# Patient Record
Sex: FEMALE | Race: ASIAN | NOT HISPANIC OR LATINO | Employment: OTHER
[De-identification: names, ages, dates, MRNs, and addresses within clinical notes are randomized per-mention and may not be internally consistent; named-entity substitution may affect disease eponyms.]

---

## 2017-03-12 ENCOUNTER — RECORDS - HEALTHEAST (OUTPATIENT)
Dept: ADMINISTRATIVE | Facility: OTHER | Age: 72
End: 2017-03-12

## 2017-03-16 ENCOUNTER — TELEPHONE (OUTPATIENT)
Dept: GASTROENTEROLOGY | Facility: OUTPATIENT CENTER | Age: 72
End: 2017-03-16

## 2017-03-16 DIAGNOSIS — Z12.11 ENCOUNTER FOR SCREENING COLONOSCOPY: Primary | ICD-10-CM

## 2017-03-16 NOTE — TELEPHONE ENCOUNTER
Patient taking any blood thinners ? no    Heart disease ? Murmur. Patient reports no problems    Lung disease ? denies      Sleep apnea ? denies    Diabetic ? denies    Kidney disease ? denies    Dialysis ? n/a    Electronic implanted medical devices ? denies    Are you taking any narcotic pain medication ?  no What is your daily dosage ?    PTSD ? n/a    Prep instructions reviewed with patient ? Instructions,  policy, conscious sedation plan reviewed. Pt. Has had exam before.    Pharmacy : [facility]    Indication for procedure : screening colonoscopy    Referring provider : Dr. Nahid Madera

## 2017-03-23 ENCOUNTER — TRANSFERRED RECORDS (OUTPATIENT)
Dept: HEALTH INFORMATION MANAGEMENT | Facility: CLINIC | Age: 72
End: 2017-03-23

## 2017-03-23 ENCOUNTER — AMBULATORY - HEALTHEAST (OUTPATIENT)
Dept: MULTI SPECIALTY CLINIC | Facility: CLINIC | Age: 72
End: 2017-03-23

## 2017-05-30 ENCOUNTER — COMMUNICATION - HEALTHEAST (OUTPATIENT)
Dept: SCHEDULING | Facility: CLINIC | Age: 72
End: 2017-05-30

## 2017-05-31 ENCOUNTER — OFFICE VISIT - HEALTHEAST (OUTPATIENT)
Dept: INTERNAL MEDICINE | Facility: CLINIC | Age: 72
End: 2017-05-31

## 2017-05-31 ENCOUNTER — AMBULATORY - HEALTHEAST (OUTPATIENT)
Dept: INTERNAL MEDICINE | Facility: CLINIC | Age: 72
End: 2017-05-31

## 2017-05-31 DIAGNOSIS — E55.9 VITAMIN D DEFICIENCY: ICD-10-CM

## 2017-05-31 DIAGNOSIS — R73.09 OTHER ABNORMAL GLUCOSE: ICD-10-CM

## 2017-05-31 DIAGNOSIS — R03.0 ELEVATED BP WITHOUT DIAGNOSIS OF HYPERTENSION: ICD-10-CM

## 2017-05-31 DIAGNOSIS — E78.5 HYPERLIPEMIA: ICD-10-CM

## 2017-05-31 DIAGNOSIS — M81.0 SENILE OSTEOPOROSIS: ICD-10-CM

## 2017-06-02 LAB
CHOLEST SERPL-MCNC: 256 MG/DL
FASTING STATUS PATIENT QL REPORTED: YES
HBA1C MFR BLD: 6 % (ref 3.5–6)
HDLC SERPL-MCNC: 62 MG/DL
LDLC SERPL CALC-MCNC: 155 MG/DL
TRIGL SERPL-MCNC: 194 MG/DL

## 2017-06-05 ENCOUNTER — COMMUNICATION - HEALTHEAST (OUTPATIENT)
Dept: INTERNAL MEDICINE | Facility: CLINIC | Age: 72
End: 2017-06-05

## 2017-06-05 DIAGNOSIS — E78.5 HYPERLIPEMIA: ICD-10-CM

## 2017-06-07 ENCOUNTER — RECORDS - HEALTHEAST (OUTPATIENT)
Dept: ADMINISTRATIVE | Facility: OTHER | Age: 72
End: 2017-06-07

## 2017-06-29 ENCOUNTER — COMMUNICATION - HEALTHEAST (OUTPATIENT)
Dept: INTERNAL MEDICINE | Facility: CLINIC | Age: 72
End: 2017-06-29

## 2017-06-29 ENCOUNTER — RECORDS - HEALTHEAST (OUTPATIENT)
Dept: ADMINISTRATIVE | Facility: OTHER | Age: 72
End: 2017-06-29

## 2017-07-27 ENCOUNTER — OFFICE VISIT - HEALTHEAST (OUTPATIENT)
Dept: INTERNAL MEDICINE | Facility: CLINIC | Age: 72
End: 2017-07-27

## 2017-07-27 DIAGNOSIS — R25.2 LEG CRAMPS: ICD-10-CM

## 2017-07-27 DIAGNOSIS — Z00.00 HEALTH CARE MAINTENANCE: ICD-10-CM

## 2017-07-27 DIAGNOSIS — Z12.11 SCREEN FOR COLON CANCER: ICD-10-CM

## 2017-07-27 DIAGNOSIS — R73.01 IMPAIRED FASTING GLUCOSE: ICD-10-CM

## 2017-07-27 DIAGNOSIS — M81.0 SENILE OSTEOPOROSIS: ICD-10-CM

## 2017-07-27 DIAGNOSIS — E55.9 VITAMIN D DEFICIENCY: ICD-10-CM

## 2017-07-27 DIAGNOSIS — E78.5 HYPERLIPEMIA: ICD-10-CM

## 2017-07-27 LAB
CHOLEST SERPL-MCNC: 257 MG/DL
FASTING STATUS PATIENT QL REPORTED: YES
HDLC SERPL-MCNC: 71 MG/DL
LDLC SERPL CALC-MCNC: 160 MG/DL
TRIGL SERPL-MCNC: 129 MG/DL

## 2017-07-27 ASSESSMENT — MIFFLIN-ST. JEOR: SCORE: 1065.79

## 2017-07-29 ENCOUNTER — COMMUNICATION - HEALTHEAST (OUTPATIENT)
Dept: INTERNAL MEDICINE | Facility: CLINIC | Age: 72
End: 2017-07-29

## 2017-07-29 DIAGNOSIS — N39.0 UTI (URINARY TRACT INFECTION): ICD-10-CM

## 2017-07-30 ENCOUNTER — COMMUNICATION - HEALTHEAST (OUTPATIENT)
Dept: INTERNAL MEDICINE | Facility: CLINIC | Age: 72
End: 2017-07-30

## 2017-08-25 ENCOUNTER — COMMUNICATION - HEALTHEAST (OUTPATIENT)
Dept: INTERNAL MEDICINE | Facility: CLINIC | Age: 72
End: 2017-08-25

## 2017-08-25 ENCOUNTER — RECORDS - HEALTHEAST (OUTPATIENT)
Dept: ADMINISTRATIVE | Facility: OTHER | Age: 72
End: 2017-08-25

## 2017-08-25 ENCOUNTER — AMBULATORY - HEALTHEAST (OUTPATIENT)
Dept: LAB | Facility: CLINIC | Age: 72
End: 2017-08-25

## 2017-08-25 DIAGNOSIS — N39.0 UTI (URINARY TRACT INFECTION): ICD-10-CM

## 2017-09-05 ENCOUNTER — COMMUNICATION - HEALTHEAST (OUTPATIENT)
Dept: INTERNAL MEDICINE | Facility: CLINIC | Age: 72
End: 2017-09-05

## 2017-09-05 ENCOUNTER — RECORDS - HEALTHEAST (OUTPATIENT)
Dept: ADMINISTRATIVE | Facility: OTHER | Age: 72
End: 2017-09-05

## 2017-09-26 ENCOUNTER — HOSPITAL ENCOUNTER (OUTPATIENT)
Dept: MAMMOGRAPHY | Facility: HOSPITAL | Age: 72
Discharge: HOME | End: 2017-09-26
Attending: INTERNAL MEDICINE

## 2017-09-26 DIAGNOSIS — Z00.00 HEALTH CARE MAINTENANCE: ICD-10-CM

## 2017-09-26 DIAGNOSIS — Z12.31 VISIT FOR SCREENING MAMMOGRAM: ICD-10-CM

## 2017-12-13 ENCOUNTER — OFFICE VISIT - HEALTHEAST (OUTPATIENT)
Dept: INTERNAL MEDICINE | Facility: CLINIC | Age: 72
End: 2017-12-13

## 2017-12-13 DIAGNOSIS — R53.83 FATIGUE: ICD-10-CM

## 2017-12-13 DIAGNOSIS — E78.5 HYPERLIPEMIA: ICD-10-CM

## 2017-12-13 DIAGNOSIS — R73.09 OTHER ABNORMAL GLUCOSE: ICD-10-CM

## 2017-12-26 ENCOUNTER — AMBULATORY - HEALTHEAST (OUTPATIENT)
Dept: LAB | Facility: CLINIC | Age: 72
End: 2017-12-26

## 2017-12-26 DIAGNOSIS — E78.5 HYPERLIPEMIA: ICD-10-CM

## 2017-12-26 DIAGNOSIS — R53.83 FATIGUE: ICD-10-CM

## 2017-12-26 DIAGNOSIS — R73.09 OTHER ABNORMAL GLUCOSE: ICD-10-CM

## 2017-12-26 LAB
CHOLEST SERPL-MCNC: 244 MG/DL
FASTING STATUS PATIENT QL REPORTED: YES
HBA1C MFR BLD: 6.1 % (ref 3.5–6)
HDLC SERPL-MCNC: 71 MG/DL
LDLC SERPL CALC-MCNC: 152 MG/DL
TRIGL SERPL-MCNC: 103 MG/DL

## 2017-12-27 ENCOUNTER — OFFICE VISIT - HEALTHEAST (OUTPATIENT)
Dept: INTERNAL MEDICINE | Facility: CLINIC | Age: 72
End: 2017-12-27

## 2017-12-27 DIAGNOSIS — E78.5 HYPERLIPEMIA: ICD-10-CM

## 2017-12-27 DIAGNOSIS — D56.3 THALASSEMIA MINOR: ICD-10-CM

## 2018-05-29 ENCOUNTER — COMMUNICATION - HEALTHEAST (OUTPATIENT)
Dept: ADMINISTRATIVE | Facility: CLINIC | Age: 73
End: 2018-05-29

## 2018-05-30 ENCOUNTER — COMMUNICATION - HEALTHEAST (OUTPATIENT)
Dept: LAB | Facility: CLINIC | Age: 73
End: 2018-05-30

## 2018-05-30 DIAGNOSIS — R73.03 PREDIABETES: ICD-10-CM

## 2018-05-30 DIAGNOSIS — M81.0 OSTEOPOROSIS: ICD-10-CM

## 2018-05-30 DIAGNOSIS — E78.5 DYSLIPIDEMIA: ICD-10-CM

## 2018-06-01 ENCOUNTER — AMBULATORY - HEALTHEAST (OUTPATIENT)
Dept: LAB | Facility: CLINIC | Age: 73
End: 2018-06-01

## 2018-06-01 DIAGNOSIS — R73.03 PREDIABETES: ICD-10-CM

## 2018-06-01 DIAGNOSIS — M81.0 OSTEOPOROSIS: ICD-10-CM

## 2018-06-01 DIAGNOSIS — E78.5 DYSLIPIDEMIA: ICD-10-CM

## 2018-06-01 LAB
ALBUMIN SERPL-MCNC: 4.1 G/DL (ref 3.5–5)
ALP SERPL-CCNC: 30 U/L (ref 45–120)
ALT SERPL W P-5'-P-CCNC: 18 U/L (ref 0–45)
ANION GAP SERPL CALCULATED.3IONS-SCNC: 11 MMOL/L (ref 5–18)
AST SERPL W P-5'-P-CCNC: 24 U/L (ref 0–40)
BILIRUB SERPL-MCNC: 0.7 MG/DL (ref 0–1)
BUN SERPL-MCNC: 18 MG/DL (ref 8–28)
CALCIUM SERPL-MCNC: 9.9 MG/DL (ref 8.5–10.5)
CHLORIDE BLD-SCNC: 104 MMOL/L (ref 98–107)
CHOLEST SERPL-MCNC: 284 MG/DL
CO2 SERPL-SCNC: 25 MMOL/L (ref 22–31)
CREAT SERPL-MCNC: 0.76 MG/DL (ref 0.6–1.1)
FASTING STATUS PATIENT QL REPORTED: YES
GFR SERPL CREATININE-BSD FRML MDRD: >60 ML/MIN/1.73M2
GLUCOSE BLD-MCNC: 105 MG/DL (ref 70–125)
HBA1C MFR BLD: 6.3 % (ref 3.5–6)
HDLC SERPL-MCNC: 76 MG/DL
LDLC SERPL CALC-MCNC: 180 MG/DL
POTASSIUM BLD-SCNC: 4.9 MMOL/L (ref 3.5–5)
PROT SERPL-MCNC: 6.9 G/DL (ref 6–8)
SODIUM SERPL-SCNC: 140 MMOL/L (ref 136–145)
TRIGL SERPL-MCNC: 138 MG/DL

## 2018-06-04 LAB
25(OH)D3 SERPL-MCNC: 36.7 NG/ML (ref 30–80)
25(OH)D3 SERPL-MCNC: 36.7 NG/ML (ref 30–80)

## 2018-06-07 ENCOUNTER — OFFICE VISIT - HEALTHEAST (OUTPATIENT)
Dept: INTERNAL MEDICINE | Facility: CLINIC | Age: 73
End: 2018-06-07

## 2018-06-07 DIAGNOSIS — M81.0 SENILE OSTEOPOROSIS: ICD-10-CM

## 2018-06-07 DIAGNOSIS — E78.5 HYPERLIPEMIA: ICD-10-CM

## 2018-06-07 ASSESSMENT — MIFFLIN-ST. JEOR: SCORE: 1063.52

## 2018-06-09 ENCOUNTER — HEALTH MAINTENANCE LETTER (OUTPATIENT)
Age: 73
End: 2018-06-09

## 2018-06-15 ENCOUNTER — OFFICE VISIT - HEALTHEAST (OUTPATIENT)
Dept: INTERNAL MEDICINE | Facility: CLINIC | Age: 73
End: 2018-06-15

## 2018-06-15 DIAGNOSIS — L73.9 FOLLICULITIS: ICD-10-CM

## 2018-08-09 ENCOUNTER — RECORDS - HEALTHEAST (OUTPATIENT)
Dept: BONE DENSITY | Facility: CLINIC | Age: 73
End: 2018-08-09

## 2018-08-09 ENCOUNTER — RECORDS - HEALTHEAST (OUTPATIENT)
Dept: ADMINISTRATIVE | Facility: OTHER | Age: 73
End: 2018-08-09

## 2018-08-09 DIAGNOSIS — M81.0 AGE-RELATED OSTEOPOROSIS WITHOUT CURRENT PATHOLOGICAL FRACTURE: ICD-10-CM

## 2018-08-29 ENCOUNTER — COMMUNICATION - HEALTHEAST (OUTPATIENT)
Dept: INTERNAL MEDICINE | Facility: CLINIC | Age: 73
End: 2018-08-29

## 2018-08-29 DIAGNOSIS — R73.09 OTHER ABNORMAL GLUCOSE: ICD-10-CM

## 2018-09-04 ENCOUNTER — COMMUNICATION - HEALTHEAST (OUTPATIENT)
Dept: INTERNAL MEDICINE | Facility: CLINIC | Age: 73
End: 2018-09-04

## 2018-09-04 DIAGNOSIS — R73.09 OTHER ABNORMAL GLUCOSE: ICD-10-CM

## 2018-09-10 ENCOUNTER — COMMUNICATION - HEALTHEAST (OUTPATIENT)
Dept: INTERNAL MEDICINE | Facility: CLINIC | Age: 73
End: 2018-09-10

## 2018-09-10 DIAGNOSIS — R73.09 OTHER ABNORMAL GLUCOSE: ICD-10-CM

## 2018-09-18 ENCOUNTER — COMMUNICATION - HEALTHEAST (OUTPATIENT)
Dept: INTERNAL MEDICINE | Facility: CLINIC | Age: 73
End: 2018-09-18

## 2018-09-18 ENCOUNTER — RECORDS - HEALTHEAST (OUTPATIENT)
Dept: ADMINISTRATIVE | Facility: OTHER | Age: 73
End: 2018-09-18

## 2018-09-23 ENCOUNTER — COMMUNICATION - HEALTHEAST (OUTPATIENT)
Dept: INTERNAL MEDICINE | Facility: CLINIC | Age: 73
End: 2018-09-23

## 2018-10-08 ENCOUNTER — COMMUNICATION - HEALTHEAST (OUTPATIENT)
Dept: ADMINISTRATIVE | Facility: CLINIC | Age: 73
End: 2018-10-08

## 2018-10-18 ENCOUNTER — OFFICE VISIT - HEALTHEAST (OUTPATIENT)
Dept: FAMILY MEDICINE | Facility: CLINIC | Age: 73
End: 2018-10-18

## 2018-10-18 ENCOUNTER — COMMUNICATION - HEALTHEAST (OUTPATIENT)
Dept: SCHEDULING | Facility: CLINIC | Age: 73
End: 2018-10-18

## 2018-10-18 DIAGNOSIS — R82.81 PYURIA: ICD-10-CM

## 2018-10-18 DIAGNOSIS — R39.9 UTI SYMPTOMS: ICD-10-CM

## 2018-10-18 LAB
ALBUMIN UR-MCNC: NEGATIVE MG/DL
APPEARANCE UR: CLEAR
BACTERIA #/AREA URNS HPF: ABNORMAL HPF
BILIRUB UR QL STRIP: NEGATIVE
COLOR UR AUTO: YELLOW
GLUCOSE UR STRIP-MCNC: NEGATIVE MG/DL
HGB UR QL STRIP: ABNORMAL
KETONES UR STRIP-MCNC: NEGATIVE MG/DL
LEUKOCYTE ESTERASE UR QL STRIP: ABNORMAL
NITRATE UR QL: NEGATIVE
PH UR STRIP: 5.5 [PH] (ref 5–8)
RBC #/AREA URNS AUTO: ABNORMAL HPF
SP GR UR STRIP: >=1.03 (ref 1–1.03)
SQUAMOUS #/AREA URNS AUTO: ABNORMAL LPF
UROBILINOGEN UR STRIP-ACNC: ABNORMAL
WBC #/AREA URNS AUTO: ABNORMAL HPF

## 2018-10-19 LAB — BACTERIA SPEC CULT: NORMAL

## 2018-12-06 ENCOUNTER — OFFICE VISIT - HEALTHEAST (OUTPATIENT)
Dept: INTERNAL MEDICINE | Facility: CLINIC | Age: 73
End: 2018-12-06

## 2018-12-06 DIAGNOSIS — R73.09 OTHER ABNORMAL GLUCOSE: ICD-10-CM

## 2018-12-06 DIAGNOSIS — D56.3 THALASSEMIA MINOR: ICD-10-CM

## 2018-12-06 DIAGNOSIS — E78.49 OTHER HYPERLIPIDEMIA: ICD-10-CM

## 2018-12-06 DIAGNOSIS — E55.9 VITAMIN D DEFICIENCY: ICD-10-CM

## 2018-12-06 DIAGNOSIS — M81.0 SENILE OSTEOPOROSIS: ICD-10-CM

## 2018-12-06 DIAGNOSIS — Z00.00 ROUTINE GENERAL MEDICAL EXAMINATION AT A HEALTH CARE FACILITY: ICD-10-CM

## 2018-12-06 LAB
ALBUMIN SERPL-MCNC: 4.3 G/DL (ref 3.5–5)
ALP SERPL-CCNC: 23 U/L (ref 45–120)
ALT SERPL W P-5'-P-CCNC: 23 U/L (ref 0–45)
ANION GAP SERPL CALCULATED.3IONS-SCNC: 12 MMOL/L (ref 5–18)
AST SERPL W P-5'-P-CCNC: 28 U/L (ref 0–40)
BILIRUB SERPL-MCNC: 0.7 MG/DL (ref 0–1)
BUN SERPL-MCNC: 17 MG/DL (ref 8–28)
CALCIUM SERPL-MCNC: 9.8 MG/DL (ref 8.5–10.5)
CHLORIDE BLD-SCNC: 102 MMOL/L (ref 98–107)
CHOLEST SERPL-MCNC: 322 MG/DL
CO2 SERPL-SCNC: 26 MMOL/L (ref 22–31)
CREAT SERPL-MCNC: 0.77 MG/DL (ref 0.6–1.1)
ERYTHROCYTE [DISTWIDTH] IN BLOOD BY AUTOMATED COUNT: 15.1 % (ref 11–14.5)
FASTING STATUS PATIENT QL REPORTED: YES
GFR SERPL CREATININE-BSD FRML MDRD: >60 ML/MIN/1.73M2
GLUCOSE BLD-MCNC: 106 MG/DL (ref 70–125)
HBA1C MFR BLD: 6.2 % (ref 3.5–6)
HCT VFR BLD AUTO: 34.8 % (ref 35–47)
HDLC SERPL-MCNC: 64 MG/DL
HGB BLD-MCNC: 11.1 G/DL (ref 12–16)
LDLC SERPL CALC-MCNC: 214 MG/DL
MCH RBC QN AUTO: 21.8 PG (ref 27–34)
MCHC RBC AUTO-ENTMCNC: 31.8 G/DL (ref 32–36)
MCV RBC AUTO: 69 FL (ref 80–100)
PLATELET # BLD AUTO: 283 THOU/UL (ref 140–440)
PMV BLD AUTO: 8.7 FL (ref 7–10)
POTASSIUM BLD-SCNC: 4.6 MMOL/L (ref 3.5–5)
PROT SERPL-MCNC: 7 G/DL (ref 6–8)
RBC # BLD AUTO: 5.08 MILL/UL (ref 3.8–5.4)
SODIUM SERPL-SCNC: 140 MMOL/L (ref 136–145)
TRIGL SERPL-MCNC: 221 MG/DL
WBC: 4.7 THOU/UL (ref 4–11)

## 2018-12-06 ASSESSMENT — MIFFLIN-ST. JEOR: SCORE: 1065.34

## 2018-12-07 LAB
25(OH)D3 SERPL-MCNC: 31.9 NG/ML (ref 30–80)
25(OH)D3 SERPL-MCNC: 31.9 NG/ML (ref 30–80)

## 2018-12-12 ENCOUNTER — COMMUNICATION - HEALTHEAST (OUTPATIENT)
Dept: INTERNAL MEDICINE | Facility: CLINIC | Age: 73
End: 2018-12-12

## 2018-12-12 DIAGNOSIS — E78.2 MIXED HYPERLIPIDEMIA: ICD-10-CM

## 2018-12-14 ENCOUNTER — AMBULATORY - HEALTHEAST (OUTPATIENT)
Dept: NURSING | Facility: CLINIC | Age: 73
End: 2018-12-14

## 2019-02-05 ENCOUNTER — COMMUNICATION - HEALTHEAST (OUTPATIENT)
Dept: INTERNAL MEDICINE | Facility: CLINIC | Age: 74
End: 2019-02-05

## 2019-02-07 ENCOUNTER — COMMUNICATION - HEALTHEAST (OUTPATIENT)
Dept: INTERNAL MEDICINE | Facility: CLINIC | Age: 74
End: 2019-02-07

## 2019-03-06 ENCOUNTER — COMMUNICATION - HEALTHEAST (OUTPATIENT)
Dept: INTERNAL MEDICINE | Facility: CLINIC | Age: 74
End: 2019-03-06

## 2019-03-06 DIAGNOSIS — R73.09 OTHER ABNORMAL GLUCOSE: ICD-10-CM

## 2019-03-12 ENCOUNTER — AMBULATORY - HEALTHEAST (OUTPATIENT)
Dept: LAB | Facility: CLINIC | Age: 74
End: 2019-03-12

## 2019-03-12 DIAGNOSIS — E78.2 MIXED HYPERLIPIDEMIA: ICD-10-CM

## 2019-03-12 DIAGNOSIS — R73.09 OTHER ABNORMAL GLUCOSE: ICD-10-CM

## 2019-03-12 LAB
CHOLEST SERPL-MCNC: 278 MG/DL
FASTING STATUS PATIENT QL REPORTED: YES
HBA1C MFR BLD: 5.9 % (ref 3.5–6)
HDLC SERPL-MCNC: 72 MG/DL
LDLC SERPL CALC-MCNC: 180 MG/DL
TRIGL SERPL-MCNC: 128 MG/DL

## 2019-03-13 ENCOUNTER — AMBULATORY - HEALTHEAST (OUTPATIENT)
Dept: ADMINISTRATIVE | Facility: CLINIC | Age: 74
End: 2019-03-13

## 2019-03-13 ENCOUNTER — OFFICE VISIT - HEALTHEAST (OUTPATIENT)
Dept: INTERNAL MEDICINE | Facility: CLINIC | Age: 74
End: 2019-03-13

## 2019-03-13 DIAGNOSIS — E78.2 MIXED HYPERLIPIDEMIA: ICD-10-CM

## 2019-03-13 DIAGNOSIS — R73.03 PREDIABETES: ICD-10-CM

## 2019-03-20 ENCOUNTER — HOSPITAL ENCOUNTER (OUTPATIENT)
Dept: CT IMAGING | Facility: CLINIC | Age: 74
Discharge: HOME | End: 2019-03-20
Attending: INTERNAL MEDICINE

## 2019-03-20 ENCOUNTER — COMMUNICATION - HEALTHEAST (OUTPATIENT)
Dept: INTERNAL MEDICINE | Facility: CLINIC | Age: 74
End: 2019-03-20

## 2019-03-20 DIAGNOSIS — E78.2 MIXED HYPERLIPIDEMIA: ICD-10-CM

## 2019-03-20 DIAGNOSIS — R73.03 PREDIABETES: ICD-10-CM

## 2019-03-20 LAB
CV CALCIUM SCORE AGATSTON LM: 0
CV CALCIUM SCORING AGATSON LAD: 128
CV CALCIUM SCORING AGATSTON CX: 0
CV CALCIUM SCORING AGATSTON RCA: 22
CV CALCIUM SCORING AGATSTON TOTAL: 150

## 2019-03-26 ENCOUNTER — OFFICE VISIT - HEALTHEAST (OUTPATIENT)
Dept: INTERNAL MEDICINE | Facility: CLINIC | Age: 74
End: 2019-03-26

## 2019-03-26 DIAGNOSIS — R93.1 AGATSTON CORONARY ARTERY CALCIUM SCORE BETWEEN 100 AND 199: ICD-10-CM

## 2019-03-26 DIAGNOSIS — M81.0 SENILE OSTEOPOROSIS: ICD-10-CM

## 2019-05-02 ENCOUNTER — OFFICE VISIT - HEALTHEAST (OUTPATIENT)
Dept: FAMILY MEDICINE | Facility: CLINIC | Age: 74
End: 2019-05-02

## 2019-05-02 DIAGNOSIS — J00 ACUTE RHINITIS: ICD-10-CM

## 2019-05-02 LAB — DEPRECATED S PYO AG THROAT QL EIA: NORMAL

## 2019-05-03 LAB — GROUP A STREP BY PCR: NORMAL

## 2019-06-05 ENCOUNTER — COMMUNICATION - HEALTHEAST (OUTPATIENT)
Dept: LAB | Facility: CLINIC | Age: 74
End: 2019-06-05

## 2019-06-05 DIAGNOSIS — E78.49 OTHER HYPERLIPIDEMIA: ICD-10-CM

## 2019-06-14 ENCOUNTER — OFFICE VISIT - HEALTHEAST (OUTPATIENT)
Dept: FAMILY MEDICINE | Facility: CLINIC | Age: 74
End: 2019-06-14

## 2019-06-14 DIAGNOSIS — S22.31XA CLOSED FRACTURE OF ONE RIB OF RIGHT SIDE, INITIAL ENCOUNTER: ICD-10-CM

## 2019-06-14 ASSESSMENT — MIFFLIN-ST. JEOR: SCORE: 910

## 2019-06-19 ENCOUNTER — AMBULATORY - HEALTHEAST (OUTPATIENT)
Dept: LAB | Facility: CLINIC | Age: 74
End: 2019-06-19

## 2019-06-19 DIAGNOSIS — E78.49 OTHER HYPERLIPIDEMIA: ICD-10-CM

## 2019-06-19 LAB
ALBUMIN SERPL-MCNC: 4 G/DL (ref 3.5–5)
ALP SERPL-CCNC: 20 U/L (ref 45–120)
ALT SERPL W P-5'-P-CCNC: 21 U/L (ref 0–45)
ANION GAP SERPL CALCULATED.3IONS-SCNC: 7 MMOL/L (ref 5–18)
AST SERPL W P-5'-P-CCNC: 23 U/L (ref 0–40)
BILIRUB SERPL-MCNC: 0.4 MG/DL (ref 0–1)
BUN SERPL-MCNC: 26 MG/DL (ref 8–28)
CALCIUM SERPL-MCNC: 10.1 MG/DL (ref 8.5–10.5)
CHLORIDE BLD-SCNC: 103 MMOL/L (ref 98–107)
CHOLEST SERPL-MCNC: 286 MG/DL
CO2 SERPL-SCNC: 30 MMOL/L (ref 22–31)
CREAT SERPL-MCNC: 0.89 MG/DL (ref 0.6–1.1)
FASTING STATUS PATIENT QL REPORTED: YES
GFR SERPL CREATININE-BSD FRML MDRD: >60 ML/MIN/1.73M2
GLUCOSE BLD-MCNC: 95 MG/DL (ref 70–125)
HDLC SERPL-MCNC: 66 MG/DL
LDLC SERPL CALC-MCNC: 186 MG/DL
POTASSIUM BLD-SCNC: 4.9 MMOL/L (ref 3.5–5)
PROT SERPL-MCNC: 6.7 G/DL (ref 6–8)
SODIUM SERPL-SCNC: 140 MMOL/L (ref 136–145)
TRIGL SERPL-MCNC: 171 MG/DL

## 2019-06-24 ENCOUNTER — COMMUNICATION - HEALTHEAST (OUTPATIENT)
Dept: INTERNAL MEDICINE | Facility: CLINIC | Age: 74
End: 2019-06-24

## 2019-06-24 ENCOUNTER — OFFICE VISIT - HEALTHEAST (OUTPATIENT)
Dept: INTERNAL MEDICINE | Facility: CLINIC | Age: 74
End: 2019-06-24

## 2019-06-24 DIAGNOSIS — E55.9 VITAMIN D DEFICIENCY: ICD-10-CM

## 2019-06-24 DIAGNOSIS — S22.31XD CLOSED FRACTURE OF ONE RIB OF RIGHT SIDE WITH ROUTINE HEALING, SUBSEQUENT ENCOUNTER: ICD-10-CM

## 2019-06-24 DIAGNOSIS — Z12.11 SCREEN FOR COLON CANCER: ICD-10-CM

## 2019-06-24 DIAGNOSIS — E78.49 OTHER HYPERLIPIDEMIA: ICD-10-CM

## 2019-06-25 LAB
25(OH)D3 SERPL-MCNC: 32.8 NG/ML (ref 30–80)
25(OH)D3 SERPL-MCNC: 32.8 NG/ML (ref 30–80)

## 2019-09-09 ENCOUNTER — RECORDS - HEALTHEAST (OUTPATIENT)
Dept: ADMINISTRATIVE | Facility: OTHER | Age: 74
End: 2019-09-09

## 2019-09-09 ENCOUNTER — COMMUNICATION - HEALTHEAST (OUTPATIENT)
Dept: LAB | Facility: CLINIC | Age: 74
End: 2019-09-09

## 2019-09-11 ENCOUNTER — OFFICE VISIT - HEALTHEAST (OUTPATIENT)
Dept: INTERNAL MEDICINE | Facility: CLINIC | Age: 74
End: 2019-09-11

## 2019-09-11 DIAGNOSIS — M81.0 SENILE OSTEOPOROSIS: ICD-10-CM

## 2019-09-11 DIAGNOSIS — R53.83 FATIGUE, UNSPECIFIED TYPE: ICD-10-CM

## 2019-09-11 DIAGNOSIS — R73.09 OTHER ABNORMAL GLUCOSE: ICD-10-CM

## 2019-09-11 DIAGNOSIS — R71.8 LOW MEAN CORPUSCULAR VOLUME (MCV): ICD-10-CM

## 2019-09-11 DIAGNOSIS — R25.2 LEG CRAMPS: ICD-10-CM

## 2019-09-11 DIAGNOSIS — R42 LIGHTHEADEDNESS: ICD-10-CM

## 2019-09-11 DIAGNOSIS — R35.1 NOCTURIA: ICD-10-CM

## 2019-09-11 DIAGNOSIS — E78.49 OTHER HYPERLIPIDEMIA: ICD-10-CM

## 2019-09-23 ENCOUNTER — COMMUNICATION - HEALTHEAST (OUTPATIENT)
Dept: INTERNAL MEDICINE | Facility: CLINIC | Age: 74
End: 2019-09-23

## 2019-09-23 DIAGNOSIS — R73.09 OTHER ABNORMAL GLUCOSE: ICD-10-CM

## 2019-09-29 ENCOUNTER — HEALTH MAINTENANCE LETTER (OUTPATIENT)
Age: 74
End: 2019-09-29

## 2019-12-04 ENCOUNTER — OFFICE VISIT - HEALTHEAST (OUTPATIENT)
Dept: FAMILY MEDICINE | Facility: CLINIC | Age: 74
End: 2019-12-04

## 2019-12-04 ENCOUNTER — AMBULATORY - HEALTHEAST (OUTPATIENT)
Dept: LAB | Facility: CLINIC | Age: 74
End: 2019-12-04

## 2019-12-04 ENCOUNTER — COMMUNICATION - HEALTHEAST (OUTPATIENT)
Dept: INTERNAL MEDICINE | Facility: CLINIC | Age: 74
End: 2019-12-04

## 2019-12-04 DIAGNOSIS — R30.0 BURNING WITH URINATION: ICD-10-CM

## 2019-12-04 DIAGNOSIS — R73.09 OTHER ABNORMAL GLUCOSE: ICD-10-CM

## 2019-12-04 DIAGNOSIS — R25.2 LEG CRAMPS: ICD-10-CM

## 2019-12-04 DIAGNOSIS — R71.8 LOW MEAN CORPUSCULAR VOLUME (MCV): ICD-10-CM

## 2019-12-04 DIAGNOSIS — R53.83 FATIGUE, UNSPECIFIED TYPE: ICD-10-CM

## 2019-12-04 DIAGNOSIS — R35.1 NOCTURIA: ICD-10-CM

## 2019-12-04 DIAGNOSIS — R42 LIGHTHEADEDNESS: ICD-10-CM

## 2019-12-04 DIAGNOSIS — R82.71 ASYMPTOMATIC BACTERIURIA: ICD-10-CM

## 2019-12-04 DIAGNOSIS — E78.49 OTHER HYPERLIPIDEMIA: ICD-10-CM

## 2019-12-04 LAB
ALBUMIN SERPL-MCNC: 4.4 G/DL (ref 3.5–5)
ALBUMIN UR-MCNC: NEGATIVE MG/DL
ALBUMIN UR-MCNC: NEGATIVE MG/DL
ALP SERPL-CCNC: 45 U/L (ref 45–120)
ALT SERPL W P-5'-P-CCNC: 17 U/L (ref 0–45)
ANION GAP SERPL CALCULATED.3IONS-SCNC: 10 MMOL/L (ref 5–18)
APPEARANCE UR: CLEAR
APPEARANCE UR: CLEAR
AST SERPL W P-5'-P-CCNC: 25 U/L (ref 0–40)
BACTERIA #/AREA URNS HPF: ABNORMAL HPF
BACTERIA #/AREA URNS HPF: ABNORMAL HPF
BILIRUB SERPL-MCNC: 0.7 MG/DL (ref 0–1)
BILIRUB UR QL STRIP: NEGATIVE
BILIRUB UR QL STRIP: NEGATIVE
BUN SERPL-MCNC: 18 MG/DL (ref 8–28)
CALCIUM SERPL-MCNC: 9.9 MG/DL (ref 8.5–10.5)
CAOX CRY #/AREA URNS HPF: PRESENT /[HPF]
CAOX CRY #/AREA URNS HPF: PRESENT /[HPF]
CHLORIDE BLD-SCNC: 102 MMOL/L (ref 98–107)
CHOLEST SERPL-MCNC: 203 MG/DL
CK SERPL-CCNC: 127 U/L (ref 30–190)
CO2 SERPL-SCNC: 28 MMOL/L (ref 22–31)
COLOR UR AUTO: YELLOW
COLOR UR AUTO: YELLOW
CREAT SERPL-MCNC: 0.82 MG/DL (ref 0.6–1.1)
ERYTHROCYTE [DISTWIDTH] IN BLOOD BY AUTOMATED COUNT: 14 % (ref 11–14.5)
FASTING STATUS PATIENT QL REPORTED: YES
FERRITIN SERPL-MCNC: 65 NG/ML (ref 10–130)
FOLATE SERPL-MCNC: 15.8 NG/ML
GFR SERPL CREATININE-BSD FRML MDRD: >60 ML/MIN/1.73M2
GLUCOSE BLD-MCNC: 100 MG/DL (ref 70–125)
GLUCOSE UR STRIP-MCNC: NEGATIVE MG/DL
GLUCOSE UR STRIP-MCNC: NEGATIVE MG/DL
HBA1C MFR BLD: 6.2 % (ref 3.5–6)
HCT VFR BLD AUTO: 36.3 % (ref 35–47)
HDLC SERPL-MCNC: 78 MG/DL
HGB BLD-MCNC: 11.3 G/DL (ref 12–16)
HGB UR QL STRIP: ABNORMAL
HGB UR QL STRIP: ABNORMAL
HYALINE CASTS #/AREA URNS LPF: ABNORMAL LPF
IRON SATN MFR SERPL: 23 % (ref 20–50)
IRON SERPL-MCNC: 91 UG/DL (ref 42–175)
KETONES UR STRIP-MCNC: NEGATIVE MG/DL
KETONES UR STRIP-MCNC: NEGATIVE MG/DL
LDLC SERPL CALC-MCNC: 105 MG/DL
LEUKOCYTE ESTERASE UR QL STRIP: ABNORMAL
LEUKOCYTE ESTERASE UR QL STRIP: NEGATIVE
MAGNESIUM SERPL-MCNC: 2 MG/DL (ref 1.8–2.6)
MCH RBC QN AUTO: 21.8 PG (ref 27–34)
MCHC RBC AUTO-ENTMCNC: 31.2 G/DL (ref 32–36)
MCV RBC AUTO: 70 FL (ref 80–100)
MUCOUS THREADS #/AREA URNS LPF: ABNORMAL LPF
NITRATE UR QL: NEGATIVE
NITRATE UR QL: POSITIVE
PH UR STRIP: 5.5 [PH] (ref 5–8)
PH UR STRIP: 7 [PH] (ref 5–8)
PLATELET # BLD AUTO: 262 THOU/UL (ref 140–440)
PMV BLD AUTO: 9.5 FL (ref 7–10)
POTASSIUM BLD-SCNC: 4.3 MMOL/L (ref 3.5–5)
PROT SERPL-MCNC: 7.3 G/DL (ref 6–8)
RBC # BLD AUTO: 5.19 MILL/UL (ref 3.8–5.4)
RBC #/AREA URNS AUTO: ABNORMAL HPF
RBC #/AREA URNS AUTO: ABNORMAL HPF
SODIUM SERPL-SCNC: 140 MMOL/L (ref 136–145)
SP GR UR STRIP: 1.02 (ref 1–1.03)
SP GR UR STRIP: 1.02 (ref 1–1.03)
SQUAMOUS #/AREA URNS AUTO: ABNORMAL LPF
SQUAMOUS #/AREA URNS AUTO: ABNORMAL LPF
TIBC SERPL-MCNC: 395 UG/DL (ref 313–563)
TRANSFERRIN SERPL-MCNC: 316 MG/DL (ref 212–360)
TRIGL SERPL-MCNC: 101 MG/DL
TSH SERPL DL<=0.005 MIU/L-ACNC: 2.45 UIU/ML (ref 0.3–5)
UROBILINOGEN UR STRIP-ACNC: ABNORMAL
UROBILINOGEN UR STRIP-ACNC: ABNORMAL
VIT B12 SERPL-MCNC: 1262 PG/ML (ref 213–816)
WBC #/AREA URNS AUTO: ABNORMAL HPF
WBC #/AREA URNS AUTO: ABNORMAL HPF
WBC: 4.1 THOU/UL (ref 4–11)

## 2019-12-06 LAB — BACTERIA SPEC CULT: ABNORMAL

## 2019-12-10 ENCOUNTER — COMMUNICATION - HEALTHEAST (OUTPATIENT)
Dept: ADMINISTRATIVE | Facility: CLINIC | Age: 74
End: 2019-12-10

## 2020-03-15 ENCOUNTER — HEALTH MAINTENANCE LETTER (OUTPATIENT)
Age: 75
End: 2020-03-15

## 2020-08-27 ENCOUNTER — RECORDS - HEALTHEAST (OUTPATIENT)
Dept: ADMINISTRATIVE | Facility: OTHER | Age: 75
End: 2020-08-27

## 2020-08-27 ENCOUNTER — RECORDS - HEALTHEAST (OUTPATIENT)
Dept: BONE DENSITY | Facility: CLINIC | Age: 75
End: 2020-08-27

## 2020-08-27 DIAGNOSIS — M81.0 AGE-RELATED OSTEOPOROSIS WITHOUT CURRENT PATHOLOGICAL FRACTURE: ICD-10-CM

## 2020-11-12 ENCOUNTER — COMMUNICATION - HEALTHEAST (OUTPATIENT)
Dept: INTERNAL MEDICINE | Facility: CLINIC | Age: 75
End: 2020-11-12

## 2020-11-12 DIAGNOSIS — Z92.29 PERSONAL HISTORY OF OTHER DRUG THERAPY: ICD-10-CM

## 2020-11-12 DIAGNOSIS — M81.0 SENILE OSTEOPOROSIS: ICD-10-CM

## 2020-11-13 ENCOUNTER — COMMUNICATION - HEALTHEAST (OUTPATIENT)
Dept: ADMINISTRATIVE | Facility: CLINIC | Age: 75
End: 2020-11-13

## 2020-11-15 ENCOUNTER — COMMUNICATION - HEALTHEAST (OUTPATIENT)
Dept: INTERNAL MEDICINE | Facility: CLINIC | Age: 75
End: 2020-11-15

## 2021-01-14 ENCOUNTER — HEALTH MAINTENANCE LETTER (OUTPATIENT)
Age: 76
End: 2021-01-14

## 2021-05-08 ENCOUNTER — HEALTH MAINTENANCE LETTER (OUTPATIENT)
Age: 76
End: 2021-05-08

## 2021-05-27 NOTE — PROGRESS NOTES
1. Osteoporosis Senile     2. Agatston coronary artery calcium score between 100 and 199  atorvastatin (LIPITOR) 20 MG tablet     Patient was educated on safety of Prolia utilizing Patient Counseling Chart for Healthcare Providers, as outlined by the Prolia REMS progam.     Return in about 3 months (around 6/26/2019) for Prolia injection.    Patient Instructions   Just Prolia in June.    Follow up in 3 months for hyperlipidemia check and fasting labs, liver tests with Luis.  We are starting Lipitor now.    I will see you for Annual Wellness Visit in December.      Chief Complaint   Patient presents with     Osteoporosis Follow Up     discuss prolia injection. does  not want to continue-still having cramps       /84   Pulse 68   Wt 112 lb 11.2 oz (51.1 kg)   BMI 16.40 kg/m        Did you experience any problems with previous Prolia injection? no  Any medication change in the last 6 months? no  Did you take prednisone or other immunosupressant drugs in the last 6 months   (chemo, transplant, rheum, dermatology conditions)? no  Did you have any serious infection in the last 6 months?no  Any recent hospitalizations?no  Do you plan any dental work in the next 2-3 months?no  How much calcium do you take daily from the diet and supplements?1200 mg  How much vit D do you take daily? 2000 IU  Last DXA? 8/2018,  Reviewed and discussed      Patient had 10 th Prolia injection on 12/14/18.. Patient tolerated previous injections well.   We discussed calcium and vit D daily needs today.   Next Prolia injection will be in 6 months.     She had coronary calcium score done on 3/20/19:    The total Agatston calcium score is 150 distributing in LAD and RCA. A calcium score in this range places the individual in the 75th percentile when compared to an age and gender matched control group and implies a high risk of cardiac events in the next ten years.     Comments:   Aggressive risk factor control.    Component      Latest Ref  Rng & Units 3/12/2019   Cholesterol      <=199 mg/dL 278 (H)   Triglycerides      <=149 mg/dL 128   HDL Cholesterol      >=50 mg/dL 72   LDL Calculated      <=129 mg/dL 180 (H)   Patient Fasting > 8hrs?       Yes     All discussed with the patient. She is on baby aspirin and will start Lipitor 20 mg daily.    25 minutes spent with the patient and more then 50 % of the time in counseling.  This note has been dictated using voice recognition software. Any grammatical or context distortions are unintentional and inherent to the software      Patient Active Problem List   Diagnosis     Osteoporosis Senile     Vitamin D Deficiency     Hyperlipemia     Prediabetes     Alpha Thalassemia Trait     Agatston coronary artery calcium score between 100 and 199       Current Outpatient Medications on File Prior to Visit   Medication Sig Dispense Refill     blood glucose test strips Use 1 each As Directed 2 (two) times a day. Use as directed. Test 2 times daily. Dx: R73.9 Non insulin dependant. 200 strip 3     blood-glucose meter Misc Use 1 Device As Directed daily. 1 each 0     calcium citrate/vitamin D3 (CITRACAL + D ORAL) Take 200 mg by mouth.       calcium-vitamin D (CALCIUM-VITAMIN D) 500 mg(1,250mg) -200 unit per tablet Take 1 tablet by mouth 2 (two) times a day with meals.       cholecalciferol, vitamin D3, (VITAMIN D3) 5,000 unit Tab Take 5,000 Units by mouth.       generic lancets (ACCU-CHEK FASTCLIX) Test blood sugar twice daily. Dispense brand per patient's insurance at pharmacy discretion. 100 each 11     magnesium 200 mg Tab Take by mouth.       VIT C/VIT E ACETATE/LUTEIN/MIN (OCUVITE LUTEIN ORAL) Take 1 capsule by mouth daily.       Current Facility-Administered Medications on File Prior to Visit   Medication Dose Route Frequency Provider Last Rate Last Dose     denosumab 60 mg (PROLIA 60 mg/ml)  60 mg Subcutaneous Q6 Months Olinda Wu MD   60 mg at 07/27/17 0937     [DISCONTINUED] denosumab 60 mg (PROLIA  60 mg/ml)  60 mg Subcutaneous Q6 Months Lottie Vergara MD   60 mg at 12/14/18 0827

## 2021-05-27 NOTE — PATIENT INSTRUCTIONS - HE
Just Prolia in June.    Follow up in 3 months for hyperlipidemia check and fasting labs, liver tests with Luis.  We are starting Lipitor now.    I will see you for Annual Wellness Visit in December.

## 2021-05-28 ENCOUNTER — RECORDS - HEALTHEAST (OUTPATIENT)
Dept: ADMINISTRATIVE | Facility: CLINIC | Age: 76
End: 2021-05-28

## 2021-05-28 NOTE — PROGRESS NOTES
Assessment:   1. Acute rhinitis  Likely secondary to viral upper respiratory infection.  - Rapid Strep A Screen-Throat  - Group A Strep, RNA Direct Detection, Throat  - fluticasone propionate (FLONASE ALLERGY RELIEF) 50 mcg/actuation nasal spray; 1 spray into each nostril 2 (two) times a day.  Dispense: 16 g; Refill: 0  - sodium chloride 0.65 % Drop; Nasal rinse twice daily  Dispense: 50 mL; Refill: 0  - cetirizine (ZYRTEC) 10 MG tablet; Take 1 tablet (10 mg total) by mouth daily.  Dispense: 30 tablet; Refill: 2     Plan:   Explained lack of efficacy of antibiotics in viral disease.  Antitussives per medication orders.  Avoid exposure to tobacco smoke and fumes.  Call if shortness of breath worsens, blood in sputum, change in character of cough, development of fever or chills, inability to maintain nutrition and hydration. Avoid exposure to tobacco smoke and fumes.  Follow-up in 6 days, or sooner as needed.  Trial of antihistamines.  Trial of steroid nasal spray.     Subjective:   Davis Mesa is a 74 y.o. female here for evaluation of a cough. Onset of symptoms was 5 days ago. Symptoms have been unchanged since that time. The cough is dry and is aggravated by nothing. Associated symptoms include: change in voice and nasal congestion and draiange. . Patient does not have a history of asthma. Patient does not have a history of environmental allergens. Patient has traveled recently. Patient does not have a history of smoking. Patient has not had a previous chest x-ray. Patient has not had a PPD done.    The following portions of the patient's history were reviewed and updated as appropriate: allergies, current medications, past family history, past medical history, past social history, past surgical history and problem list.    Review of Systems  Pertinent items are noted in HPI.      Objective:   Oxygen saturation 100% on room air  /72   Pulse 67   Temp 98.1  F (36.7  C) (Oral)   Wt 113 lb 9 oz (51.5 kg)    SpO2 100%   BMI 16.53 kg/m    General appearance: alert, appears stated age and cooperative  Head: Normocephalic, without obvious abnormality, atraumatic  Eyes: conjunctivae/corneas clear. PERRL, EOM's intact. Fundi benign.  Ears: normal TM's and external ear canals both ears  Nose: clear discharge, moderate congestion, turbinates normal  Throat: lips, mucosa, and tongue normal; teeth and gums normal  Neck: no adenopathy, no carotid bruit, no JVD, supple, symmetrical, trachea midline and thyroid not enlarged, symmetric, no tenderness/mass/nodules  Lungs: clear to auscultation bilaterally  Heart: regular rate and rhythm, S1, S2 normal, no murmur, click, rub or gallop  Extremities: extremities normal, atraumatic, no cyanosis or edema  Pulses: 2+ and symmetric  Skin: Skin color, texture, turgor normal. No rashes or lesions  Lymph nodes: Cervical, supraclavicular, and axillary nodes normal.  Neurologic: Grossly normal

## 2021-05-29 ENCOUNTER — RECORDS - HEALTHEAST (OUTPATIENT)
Dept: ADMINISTRATIVE | Facility: CLINIC | Age: 76
End: 2021-05-29

## 2021-05-29 NOTE — PROGRESS NOTES
Clinic Note    Assessment:     Assessment and Plan:  1. Vitamin D deficiency  She is on 5000 IU daily.  She like this rechecked today.  She has been borderline low in prior lab tests  - Vitamin D, Total (25-Hydroxy)    2. Other hyperlipidemia  She has not been taking her atorvastatin 20 mg on a regular basis.  We reviewed her lab results from 6/19.  We need to do better on her LDL.  HDL at goal.  She is going to schedule appointment with me in 3 months for recheck.    3. Closed fracture of one rib of right side with routine healing, subsequent encounter  She fractured a rib 2 weeks ago but says that pain has been well controlled.    4. Screen for colon cancer  She insists that she had a colonoscopy within the last few years.  She is going to look for records and bring those with her to her appointment with me in 3 months.  - Ambulatory referral for Colonoscopy       Patient Instructions   Recheck vitamin D levels today.  No changes in vitamin D supplement for now.    Start taking your atorvastatin medication every day.  You can take it at night.    Recheck cholesterol labs in 3 months when you come back from vacation.    We can help you schedule an appointment today before you leave.    Try to rest so that your ribs can heal.    Blood pressure and other vital signs look good today.    Look for your colonoscopy records for when you come back and see me in 3 months.    Return in about 3 months (around 9/24/2019).         Subjective:      Patient comes to the clinic today for follow-up of her hyperlipidemia.    She also suffered a rib fracture a couple of weeks ago.    She had her cholesterol values checked about 5 days ago.  Triglycerides 171.  Cholesterol 286.  .  HDL 66.  She had a coronary artery calcium score of 150.  She is prescribed atorvastatin 20 mg daily.  Today, patient tells me that she had only recently started taking her cholesterol medication a few days prior to her lipid profile.  She has had  issues with lower extremity myalgias from the medication.  Dr. Tiffanie Tong had prescribed her pravastatin as an alternative in the past but she never started it.    Few weeks ago she was walking when she tripped and fell and landed on a piece of wood coming out of the ground.  She suffered a rib fracture on her 10th rib.  She is been using OTC pain medication which has been helping tremendously.  She was quite active the other day helping a family member with yard work which seemed to exacerbate her pain a little bit.  Today, she is doing well in this regard.    Her health maintenance is indicating she is due for colonoscopy.  Patient insists that she has had one within the last few years.  She is going to look for the records and bring them with her to a follow-up appointment with me in 3 months.    The following portions of the patient's history were reviewed and updated as appropriate: Allergies, medications, problems, prior note.    Review of Systems:    Review is otherwise negative except for what is mentioned above.     Social Hx:    Social History     Tobacco Use   Smoking Status Never Smoker   Smokeless Tobacco Never Used         Objective:     Vitals:    06/24/19 0801   BP: 120/70   Pulse: 64   Weight: 114 lb 8 oz (51.9 kg)       Exam:    General: No apparent distress. Calm. Alert and Oriented X3. Pt behavior is appropriate.      Patient Active Problem List   Diagnosis     Osteoporosis Senile     Vitamin D Deficiency     Hyperlipemia     Prediabetes     Alpha Thalassemia Trait     Agatston coronary artery calcium score between 100 and 199     Current Outpatient Medications   Medication Sig Dispense Refill     aspirin 81 MG EC tablet Take 81 mg by mouth daily.       atorvastatin (LIPITOR) 20 MG tablet Take 1 tablet (20 mg total) by mouth at bedtime. 90 tablet 3     blood glucose test strips Use 1 each As Directed 2 (two) times a day. Use as directed. Test 2 times daily. Dx: R73.9 Non insulin dependant. 200  strip 3     blood-glucose meter Misc Use 1 Device As Directed daily. 1 each 0     calcium citrate/vitamin D3 (CITRACAL + D ORAL) Take 200 mg by mouth.       calcium-vitamin D (CALCIUM-VITAMIN D) 500 mg(1,250mg) -200 unit per tablet Take 1 tablet by mouth 2 (two) times a day with meals.       cetirizine (ZYRTEC) 10 MG tablet Take 1 tablet (10 mg total) by mouth daily. 30 tablet 2     cholecalciferol, vitamin D3, (VITAMIN D3) 5,000 unit Tab Take 5,000 Units by mouth.       generic lancets (ACCU-CHEK FASTCLIX) Test blood sugar twice daily. Dispense brand per patient's insurance at pharmacy discretion. 100 each 11     magnesium 200 mg Tab Take by mouth.       sodium chloride 0.65 % Drop Nasal rinse twice daily 50 mL 0     VIT C/VIT E ACETATE/LUTEIN/MIN (OCUVITE LUTEIN ORAL) Take 1 capsule by mouth daily.       Current Facility-Administered Medications   Medication Dose Route Frequency Provider Last Rate Last Dose     denosumab 60 mg (PROLIA 60 mg/ml)  60 mg Subcutaneous Q6 Months Olinda Wu MD   60 mg at 07/27/17 0937           Viktor Smith CNP (Rob)    6/24/2019

## 2021-05-29 NOTE — TELEPHONE ENCOUNTER
Who is calling:  Mercedes gonzalez [facility]  Reason for Call:  Calling in regards to a referral for a colonoscopy. She stated that the patient had one in 2017  And wondering if there was a reason on why the patient needed another one.   Date of last appointment with primary care: 06.24.19  Okay to leave a detailed message: Yes

## 2021-05-29 NOTE — PROGRESS NOTES
1. Closed fracture of one rib of right side, initial encounter  XR Ribs Right W PA Chest      Plan: We discussed that she is has one nondisplaced rib fracture, and she can continue to use Tylenol or ibuprofen as needed for the pain.  She should continue to try to take deep breaths occasionally when she can.  She will let us know if she gets a cough or fever or chills at all.  Otherwise I would imagine this would heal up pretty normally in the next couple of weeks.  She will follow-up with us if she continues to have trouble.    Subjective: 74-year-old female who is here because of a fall.  She states that she fell yesterday and hit her right side of her rib cage on a piece of wood that was on the ground at the time.  She did not think much of it at first.  Today she finds it very painful to move and it hurts when she takes a deep breath or coughs.  She has been taking some ibuprofen and that has actually helped quite a bit so much so that she almost did not come in today.  She does not have a fever or chills or a persistent cough.  No bruising noted by the patient.    Objective: Well-appearing female in no acute distress.  Vital signs as noted.  Chest clear to auscultation all fields.  Heart regular rate and rhythm.  The ribs show no obvious step-off deformity.  There is no obvious bruising seen she is a little bit tender to palpation in that mid axillary line area.    X-ray read by me which does show a one small nondisplaced rib fracture but the chest otherwise looks normal.    Xr Ribs Right W Pa Chest    Result Date: 6/14/2019  EXAM: XR RIBS RIGHT W PA CHEST LOCATION: [facility] DATE/TIME: 6/14/2019 1:47 PM INDICATION: fell onto right side yesterday, still with rib pain today COMPARISON: None. FINDINGS: Nondisplaced fracture of the right 10th rib anterolaterally. Mild thoracic curve. Calcified aortic atherosclerosis.

## 2021-05-29 NOTE — PATIENT INSTRUCTIONS - HE
Recheck vitamin D levels today.  No changes in vitamin D supplement for now.    Start taking your atorvastatin medication every day.  You can take it at night.    Recheck cholesterol labs in 3 months when you come back from vacation.    We can help you schedule an appointment today before you leave.    Try to rest so that your ribs can heal.    Blood pressure and other vital signs look good today.    Look for your colonoscopy records for when you come back and see me in 3 months.

## 2021-05-30 ENCOUNTER — RECORDS - HEALTHEAST (OUTPATIENT)
Dept: ADMINISTRATIVE | Facility: CLINIC | Age: 76
End: 2021-05-30

## 2021-05-30 NOTE — TELEPHONE ENCOUNTER
Please ask [facility] for the copy of her colonoscopy in 2017 and update her chart. She does not need another one if she had it in 2017.

## 2021-05-31 VITALS — BODY MASS INDEX: 16.05 KG/M2 | HEIGHT: 70 IN | WEIGHT: 112.1 LBS

## 2021-05-31 VITALS — WEIGHT: 110 LBS | BODY MASS INDEX: 16.01 KG/M2

## 2021-05-31 VITALS — WEIGHT: 112.19 LBS

## 2021-05-31 VITALS — BODY MASS INDEX: 16.07 KG/M2 | WEIGHT: 110.44 LBS

## 2021-06-01 VITALS — BODY MASS INDEX: 16.24 KG/M2 | WEIGHT: 111.6 LBS

## 2021-06-01 VITALS — HEIGHT: 70 IN | WEIGHT: 111.6 LBS | BODY MASS INDEX: 15.98 KG/M2

## 2021-06-01 NOTE — TELEPHONE ENCOUNTER
"Patient has a future lab appointment on 09/17/2019 for \"Fasting labs\". There are no lab orders. Could you please review the patient's chart and place orders?    If no lab orders are needed at this time, please forward this message to [facility] Registration Pool. They will then call the patient and inform them that no labs are needed at this time per provider.     Thanks  "

## 2021-06-01 NOTE — PROGRESS NOTES
Assessment/Plan:        1. Nocturia  Urinalysis-UC if Indicated    Urinalysis-UC if Indicated    CANCELED: Urinalysis-UC if Indicated   2. Other hyperlipidemia  Lipid Profile    Comprehensive Metabolic Panel   3. Leg cramps  Magnesium   4. Prediabetes  Glycosylated Hemoglobin A1c   5. Lightheadedness  HM2(CBC w/o Differential)   6. Low mean corpuscular volume (MCV)  Ferritin    Iron and Transferrin Iron Binding Capacity    CANCELED: Iron and Transferrin Iron Binding Capacity    CANCELED: Ferritin   7. Osteoporosis Senile  DXA Bone Density Scan       Return in about 6 months (around 3/11/2020) for Annual physical.    Patient Instructions   DXA in August 2020.  If you decide to continue active treatment with Prolia, please call our Clinic to schedule. Your last Prolia dose was in 12/2018.      Chief Complaint   Patient presents with     Follow-up     Discuss Prolia     Patient is here today with multiple concerns:  1. She missed Prolia injection in JUne. Her both sisters told her that this medication is dangerous. Especially concerned about atypical femoral fractures and ONJ, which both happened in 10-40:100.000 people. She is getting Prolia since 2013 and never had a side effects. Her risk of having osteoporotic fracture is much higher than having AFF. All discussed in details and she will call to schedule Prolia if she wants to continue active treatment.  Patient was educated on safety of Prolia utilizing Patient Counseling Chart for Healthcare Providers, as outlined by the Prolia REMS progam.     2. She mentioned frequent night time urination without dysuria or hematuria, fever or chills. We should check her urine.  3. She has hyperlipidemia with elevated coronary calcium score and is taking Lipitor now daily for 3 months. She will be back for fasting labs. She complained about occasional nighttime cramps and I will check CK and Mg and electrolytes.  4. She is tired more and feel lightheaded sometime. She thinks  that she is maybe anemic, but denies any symptoms of GI bleed. Her diet is very restricted and her main source of protein in tofu. She had alpha thalassemia trait. We will check labs today.    /71   Pulse 67   Wt 115 lb 14.4 oz (52.6 kg)   SpO2 98%   BMI 23.41 kg/m    25 minutes spent with the patient and more then 50 % of the time in counseling.  This note has been dictated using voice recognition software. Any grammatical or context distortions are unintentional and inherent to the software      Patient Active Problem List   Diagnosis     Osteoporosis Senile     Vitamin D Deficiency     Hyperlipemia     Prediabetes     Alpha Thalassemia Trait     Agatston coronary artery calcium score between 100 and 199       Current Outpatient Medications on File Prior to Visit   Medication Sig Dispense Refill     aspirin 81 MG EC tablet Take 81 mg by mouth daily.       atorvastatin (LIPITOR) 20 MG tablet Take 1 tablet (20 mg total) by mouth at bedtime. 90 tablet 3     blood glucose test strips Use 1 each As Directed 2 (two) times a day. Use as directed. Test 2 times daily. Dx: R73.9 Non insulin dependant. 200 strip 3     blood-glucose meter Misc Use 1 Device As Directed daily. 1 each 0     calcium citrate/vitamin D3 (CITRACAL + D ORAL) Take 200 mg by mouth.       calcium-vitamin D (CALCIUM-VITAMIN D) 500 mg(1,250mg) -200 unit per tablet Take 1 tablet by mouth 2 (two) times a day with meals.       cholecalciferol, vitamin D3, (VITAMIN D3) 5,000 unit Tab Take 5,000 Units by mouth.       generic lancets (ACCU-CHEK FASTCLIX) Test blood sugar twice daily. Dispense brand per patient's insurance at pharmacy discretion. 100 each 11     sodium chloride 0.65 % Drop Nasal rinse twice daily 50 mL 0     VIT C/VIT E ACETATE/LUTEIN/MIN (OCUVITE LUTEIN ORAL) Take 1 capsule by mouth daily.       cetirizine (ZYRTEC) 10 MG tablet Take 1 tablet (10 mg total) by mouth daily. 30 tablet 2     magnesium 200 mg Tab Take by mouth.        Current Facility-Administered Medications on File Prior to Visit   Medication Dose Route Frequency Provider Last Rate Last Dose     denosumab 60 mg (PROLIA 60 mg/ml)  60 mg Subcutaneous Q6 Months Olinda Wu MD   60 mg at 07/27/17 0961

## 2021-06-01 NOTE — PATIENT INSTRUCTIONS - HE
DXA in August 2020.  If you decide to continue active treatment with Prolia, please call our Clinic to schedule. Your last Prolia dose was in 12/2018.

## 2021-06-01 NOTE — TELEPHONE ENCOUNTER
RN cannot approve Refill Request    RN can NOT refill this medication Protocol failed and RN gave 3 month refill.     Yen Rowan, [facility] Triage/Med Refill 9/24/2019    Requested Prescriptions   Pending Prescriptions Disp Refills     blood glucose test (ACCU-CHEK THEO PLUS TEST STRP) strips [Pharmacy Med Name: ACCU-CHEK THEO PLUS  TOYIN] 200 strip 0     Sig: USE TO TEST BLOOD SUGAR TWICE DAILY       Diabetic Supplies Refill Protocol Failed - 9/23/2019  6:52 PM        Failed - A1C in last 6 months     Hemoglobin A1c   Date Value Ref Range Status   03/12/2019 5.9 3.5 - 6.0 % Final               Passed - Visit with PCP or prescribing provider visit in last 6 months     Last office visit with prescriber/PCP: 6/24/2019 Viktor Smith CNP OR same dept: 9/11/2019 Lottie Vergara MD OR same specialty: 9/11/2019 Lottie Vergara MD  Last physical: Visit date not found Last MTM visit: Visit date not found   Next visit within 3 mo: Visit date not found  Next physical within 3 mo: Visit date not found  Prescriber OR PCP: Viktor Smith CNP  Last diagnosis associated with med order: 1. Prediabetes  - ACCU-CHEK THEO PLUS TEST STRP strips [Pharmacy Med Name: ACCU-CHEK THEO PLUS  TOYIN]; USE TO TEST BLOOD SUGAR TWICE DAILY  Dispense: 100 strip; Refill: 11    If protocol passes may refill for 12 months if within 3 months of last provider visit (or a total of 15 months).

## 2021-06-02 ENCOUNTER — RECORDS - HEALTHEAST (OUTPATIENT)
Dept: ADMINISTRATIVE | Facility: CLINIC | Age: 76
End: 2021-06-02

## 2021-06-02 VITALS — WEIGHT: 112 LBS | BODY MASS INDEX: 16.03 KG/M2 | HEIGHT: 70 IN

## 2021-06-02 VITALS — BODY MASS INDEX: 16.4 KG/M2 | WEIGHT: 112.7 LBS

## 2021-06-02 VITALS — WEIGHT: 113 LBS | BODY MASS INDEX: 16.45 KG/M2

## 2021-06-03 VITALS
SYSTOLIC BLOOD PRESSURE: 125 MMHG | OXYGEN SATURATION: 98 % | WEIGHT: 115.9 LBS | BODY MASS INDEX: 23.41 KG/M2 | DIASTOLIC BLOOD PRESSURE: 71 MMHG | HEART RATE: 67 BPM

## 2021-06-03 VITALS — WEIGHT: 113.56 LBS | BODY MASS INDEX: 16.53 KG/M2

## 2021-06-03 VITALS — BODY MASS INDEX: 23.13 KG/M2 | WEIGHT: 114.5 LBS

## 2021-06-03 VITALS — WEIGHT: 114.5 LBS | BODY MASS INDEX: 23.08 KG/M2 | HEIGHT: 59 IN

## 2021-06-03 NOTE — TELEPHONE ENCOUNTER
It appears that patient came in today to have routine labs drawn including urinalysis.    It looks like the urine analysis may have been contaminated, but cannot rule out a urinary tract infection.    Contact patient and recommend that she get evaluated in urgent care for possible urinary tract infection.

## 2021-06-04 VITALS
DIASTOLIC BLOOD PRESSURE: 60 MMHG | OXYGEN SATURATION: 99 % | HEART RATE: 74 BPM | WEIGHT: 114.2 LBS | RESPIRATION RATE: 16 BRPM | SYSTOLIC BLOOD PRESSURE: 120 MMHG | TEMPERATURE: 98.2 F | BODY MASS INDEX: 23.07 KG/M2

## 2021-06-04 NOTE — PROGRESS NOTES
Impression:  Asymptomatic bacteriuria.  The repeat urinalysis shows her chronic hematuria but no bacteriuria or pyuria first 1 was probably contaminated    Plan:  Aloe up with primary care as scheduled return if new or worsening symptoms      Chief Complaint:  Chief Complaint   Patient presents with     burning with urination but none right now.  no fevers         HPI:   Davis Mesa is a 74 y.o. female who presents to this clinic for the evaluation of abnormal urinalysis result.  The patient brought in some routine lab work today in preparation for an upcoming appointment with her primary care doctor.  She was called and told that her urinalysis was abnormal and she should come back and be evaluated.  However she has no symptoms at all.  No fever or back pain.  No dysuria or hematuria or urgency or frequency.  She does have chronic urinary incontinence and she is been worked up by urology for that but there is no different.  She uses a pessary for the incontinence.  No other complaints      PMH:   No past medical history on file.  No past surgical history on file.      ROS:    All other systems negative    Meds:    Current Outpatient Medications:      aspirin 81 MG EC tablet, Take 81 mg by mouth daily., Disp: , Rfl:      atorvastatin (LIPITOR) 20 MG tablet, Take 1 tablet (20 mg total) by mouth at bedtime., Disp: 90 tablet, Rfl: 3     blood glucose test (ACCU-CHEK THEO PLUS TEST STRP) strips, USE TO TEST BLOOD SUGAR TWICE DAILY, Disp: 200 strip, Rfl: 0     blood glucose test strips, Use 1 each As Directed 2 (two) times a day. Use as directed. Test 2 times daily. Dx: R73.9 Non insulin dependant., Disp: 200 strip, Rfl: 3     blood-glucose meter Misc, Use 1 Device As Directed daily., Disp: 1 each, Rfl: 0     calcium-vitamin D (CALCIUM-VITAMIN D) 500 mg(1,250mg) -200 unit per tablet, Take 1 tablet by mouth 2 (two) times a day with meals., Disp: , Rfl:      cholecalciferol, vitamin D3, (VITAMIN D3) 5,000 unit Tab, Take  5,000 Units by mouth., Disp: , Rfl:      generic lancets (ACCU-CHEK FASTCLIX), Test blood sugar twice daily. Dispense brand per patient's insurance at pharmacy discretion., Disp: 100 each, Rfl: 11     magnesium 200 mg Tab, Take by mouth., Disp: , Rfl:      VIT C/VIT E ACETATE/LUTEIN/MIN (OCUVITE LUTEIN ORAL), Take 1 capsule by mouth daily., Disp: , Rfl:      calcium citrate/vitamin D3 (CITRACAL + D ORAL), Take 200 mg by mouth., Disp: , Rfl:      cetirizine (ZYRTEC) 10 MG tablet, Take 1 tablet (10 mg total) by mouth daily., Disp: 30 tablet, Rfl: 2     sodium chloride 0.65 % Drop, Nasal rinse twice daily, Disp: 50 mL, Rfl: 0    Current Facility-Administered Medications:      denosumab 60 mg (PROLIA 60 mg/ml), 60 mg, Subcutaneous, Q6 Months, Olinda Wu MD, 60 mg at 07/27/17 0937        Social:  Social History     Socioeconomic History     Marital status:      Spouse name: Not on file     Number of children: Not on file     Years of education: Not on file     Highest education level: Not on file   Occupational History     Not on file   Social Needs     Financial resource strain: Not on file     Food insecurity:     Worry: Not on file     Inability: Not on file     Transportation needs:     Medical: Not on file     Non-medical: Not on file   Tobacco Use     Smoking status: Never Smoker     Smokeless tobacco: Never Used   Substance and Sexual Activity     Alcohol use: No     Drug use: No     Sexual activity: Never   Lifestyle     Physical activity:     Days per week: Not on file     Minutes per session: Not on file     Stress: Not on file   Relationships     Social connections:     Talks on phone: Not on file     Gets together: Not on file     Attends Jainism service: Not on file     Active member of club or organization: Not on file     Attends meetings of clubs or organizations: Not on file     Relationship status: Not on file     Intimate partner violence:     Fear of current or ex partner: Not on  file     Emotionally abused: Not on file     Physically abused: Not on file     Forced sexual activity: Not on file   Other Topics Concern     Not on file   Social History Narrative     Not on file         Physical Exam:  [unfilled]  Vital signs reviewed  No CVA tenderness  Neuro: Normal motor and sensory function in all extremities  Psych: Awake, alert, normally responsive      Results:    Recent Results (from the past 24 hour(s))   Glycosylated Hemoglobin A1c   Result Value Ref Range    Hemoglobin A1c 6.2 (H) 3.5 - 6.0 %   HM2(CBC w/o Differential)   Result Value Ref Range    WBC 4.1 4.0 - 11.0 thou/uL    RBC 5.19 3.80 - 5.40 mill/uL    Hemoglobin 11.3 (L) 12.0 - 16.0 g/dL    Hematocrit 36.3 35.0 - 47.0 %    MCV 70 (L) 80 - 100 fL    MCH 21.8 (L) 27.0 - 34.0 pg    MCHC 31.2 (L) 32.0 - 36.0 g/dL    RDW 14.0 11.0 - 14.5 %    Platelets 262 140 - 440 thou/uL    MPV 9.5 7.0 - 10.0 fL   Urinalysis-UC if Indicated   Result Value Ref Range    Color, UA Yellow Colorless, Yellow, Straw, Light Yellow    Clarity, UA Clear Clear    Glucose, UA Negative Negative    Bilirubin, UA Negative Negative    Ketones, UA Negative Negative    Specific Gravity, UA 1.020 1.005 - 1.030    Blood, UA Small (!) Negative    pH, UA 7.0 5.0 - 8.0    Protein, UA Negative Negative mg/dL    Urobilinogen, UA 0.2 E.U./dL 0.2 E.U./dL, 1.0 E.U./dL    Nitrite, UA Positive (!) Negative    Leukocytes, UA Moderate (!) Negative    Bacteria, UA Many (!) None Seen hpf    RBC, UA 3-5 (!) None Seen, 0-2 hpf    WBC, UA 25-50 (!) None Seen, 0-5 hpf    Squam Epithel, UA 25-50 (!) None Seen, 0-5 lpf    Ca Oxalate Kathleen, UA Present (!) None Seen   Urinalysis-UC if Indicated   Result Value Ref Range    Color, UA Yellow Colorless, Yellow, Straw, Light Yellow    Clarity, UA Clear Clear    Glucose, UA Negative Negative    Bilirubin, UA Negative Negative    Ketones, UA Negative Negative    Specific Gravity, UA 1.025 1.005 - 1.030    Blood, UA Moderate (!) Negative    pH, UA  5.5 5.0 - 8.0    Protein, UA Negative Negative mg/dL    Urobilinogen, UA 0.2 E.U./dL 0.2 E.U./dL, 1.0 E.U./dL    Nitrite, UA Negative Negative    Leukocytes, UA Negative Negative    Bacteria, UA Few (!) None Seen hpf    RBC, UA 5-10 (!) None Seen, 0-2 hpf    WBC, UA 0-5 None Seen, 0-5 hpf    Squam Epithel, UA 0-5 None Seen, 0-5 lpf    Mucus, UA Few (!) None Seen lpf    Ca Oxalate Kathleen, UA Present (!) None Seen    Hyaline Casts, UA 0-5 0-5, None Seen lpf       No results found.      Joao Burnett MD

## 2021-06-11 NOTE — PROGRESS NOTES
ASSESSMENT and PLAN:  1. Prediabetes  She's worried about developing this due to a FHx.  Labs today.  BMI is low, not high.  - Glycosylated Hemoglobin A1c  - Basic Metabolic Panel    2. Osteoporosis Senile  She was on prolia.  She'd seen Dr. Wu and then Dr. Vergara.  She's off of prolia at the recommendation of her dentist.  I've encouraged her to f/u w/ Dr. Vergara regarding this medication.  I think she's due for a dexa in the summer.    3. Vitamin D deficiency  We can recheck this when she has her px or osteo f/u.    4. Hyperlipemia  She's off of her atorvastatin.  I suggested we try pravastatin or crestor if needed.  She'd consider these.  - Lipid Cascade    5. Elevated BP without diagnosis of hypertension  This was likely related to stress w/ her family and her role as a care giver.  BP is fine today.  Sx resolved.  We'll check a TSH.  - Thyroid Trent    Patient Instructions   Today's labs will be available on [email or web address].  If you aren't signed up, then we'll mail them out.  If anything needs more immediate follow up, we'll also call.  You can see Dr. Vergara for your osteoporosis.  And then, set up your physical with me when that's due.  Take care!        CHIEF COMPLAINT:  Chief Complaint   Patient presents with     Hypertension     Felt light headed and dizzy since last Friday       HISTORY OF PRESENT ILLNESS:  Davis Mesa is a 72 y.o. female  presenting to the clinic today for Hypertension.     Elevated Blood Pressure/light Headedness: She had an episode on Saturday of light headedness and nausea. She felt as though the room was spinning and also had a small headache. She checked her blood pressure and the systolic was 155 systolic and diastolic around 70, the worst it has ever been. Her normal systolic blood pressure is usually near 130 to 140.  She is no longer experiencing the headaches, but still has residual light headedness today. She thought it may have been exacerbated by a cold so she took  an over the counter medication to see if that would help. She does not believe she was dehydrated because she drinks adequate amounts of water. She measures her blood pressure at home regularly. Her blood pressure today was 130/60.     Hyperlipidemia: She stopped taking Lipitor about 2 months due to family emergencies and due to associated muscle aches. Her levels from 10/24/16 were 214 cholesterol, 104 triglycerides, 122 LDL, and 72 HDL. She will return for fasting labs.     Osteoporosis: She stopped Prolia because she had a tooth extraction about a month ago. She would like to discontinue taking Prolia.     Health Maintenance: She is up to date on her colonoscopy and will provide records of it.     REVIEW OF SYSTEMS:   She would like her A1c checked because she is borderline diabetic. She is under a lot of stress due to being the caretaker of two sisters with alzheimer's. She has had a trace of blood in her urine for about 20 years. She is followed by a urologist. All other systems are negative.      TOBACCO USE:  History   Smoking Status     Never Smoker   Smokeless Tobacco     Not on file       VITALS:  Vitals:    05/31/17 1315   BP: 130/60   Patient Site: Right Arm   Pulse: 80   Weight: 112 lb 3 oz (50.9 kg)     Wt Readings from Last 3 Encounters:   05/31/17 112 lb 3 oz (50.9 kg)   10/27/16 113 lb 14.4 oz (51.7 kg)   07/06/16 112 lb (50.8 kg)       PHYSICAL EXAM:  Constitutional:  Reveals an alert, pleasant middle aged female.   Vitals:  Noted.   Lungs: Clear to auscultation bilaterally, respirations unlabored.   Heart: Regular rate and rhythm, S1 and S2 normal, no murmur, rub, or gallop,   Extremities: Extremities normal, atraumatic, no cyanosis or edema   Neurologic: Normal     ADDITIONAL HISTORY SUMMARIZED (2): None.  DECISION TO OBTAIN EXTRA INFORMATION (1): None.   RADIOLOGY TESTS (1): None.  LABS (1): Reviewed labs from 10/24/16. Ordered labs.   MEDICINE TESTS (1): None.  INDEPENDENT REVIEW (2 each):  None.     The visit lasted a total of 8 minutes face to face with the patient. Over 50% of the time was spent counseling and educating the patient about light headedness.    I, Laura Bansal, am scribing for and in the presence of, Dr. Tiffanie Tong.    I, Dr. Tiffanie Tong, personally performed the services described in this documentation, as scribed by Laura Bansal in my presence, and it is both accurate and complete.    MEDICATIONS:  Current Outpatient Prescriptions   Medication Sig Dispense Refill     blood glucose test strips Test blood sugar twice daily. Dispense brand per patient's insurance at pharmacy discretion. 100 each 11     calcium-vitamin D (CALCIUM-VITAMIN D) 500 mg(1,250mg) -200 unit per tablet Take 1 tablet by mouth 2 (two) times a day with meals.       cholecalciferol, vitamin D3, (VITAMIN D3) 2,000 unit Tab Take 5,000 Units by mouth daily.        generic lancets (ACCU-CHEK FASTCLIX) Test blood sugar twice daily. Dispense brand per patient's insurance at pharmacy discretion. 100 each 11     magnesium 200 mg Tab Take by mouth.       VIT C/VIT E ACETATE/LUTEIN/MIN (OCUVITE LUTEIN ORAL) Take 1 capsule by mouth daily.       Current Facility-Administered Medications   Medication Dose Route Frequency Provider Last Rate Last Dose     denosumab 60 mg (PROLIA 60 mg/ml)  60 mg Subcutaneous Q6 Months Olinda Wu MD   60 mg at 07/06/16 1008       Total data points: 1

## 2021-06-12 NOTE — PROGRESS NOTES
Assessment:     Healthy female exam.   All preventive exams are up-to-date.  Fasting labs will be done today.  Osteoporosis management discussed - she agreed to continue Prolia.  Prediabetes discussed - HgbA1c reviewed  BP is normal and occasional elevations are mostly related to stress and anxiety. She is taking care of her 2 sisters who have dementia.      This note has been dictated using voice recognition software. Any grammatical or context distortions are unintentional and inherent to the software    1. Hyperlipemia  Lipid Profile   2. Vitamin D deficiency  Vitamin D, Total (25-Hydroxy)   3. Health care maintenance  HM2(CBC w/o Differential)    Comprehensive Metabolic Panel    Urinalysis-UC if Indicated    Mammo Screening Bilateral   4. Screen for colon cancer  CANCELED: Ambulatory referral for Colonoscopy   5. Osteoporosis Senile  DXA Bone Density Scan   6. Impaired fasting glucose  CANCELED: Glycosylated Hemoglobin A1c   7. Leg cramps  Magnesium         Patient Instructions   Prolia 8th today.  Prolia 9th in 6 months with my nurse with my nurse. I will see you in 1 year.  DXA in 8/2018 .   Phone number to schedule [phone].  Please avoid any extensive dental work as implants and teeth extractions for the next 1-2 months.  Daily calcium need is [id number] mg a day from the diet and supplements.  Calcium citrate is easier to digest.  Vitamin D 2000 IU daily recommended.      Follow up cholesterol and blood sugar in 6 months.      Plan:          Return in about 1 year (around 7/27/2018) for Annual physical.    Subjective:       Chief Complaint   Patient presents with     Annual Exam     dexa-8/8/16 q2, mammo-2/1/16, colon-3/23/17, fasting labs, questions about prolia       Davis Mesa is a 72 y.o. female who presents for an annual exam.    We did review her chronic medical problems in the last visit with Dr. Tiffanie Tong from May 31 when she came concerned that she has occasional elevations of the blood  pressure and she was also concerned about her blood sugar and she stopped taking the Lipitor because of the muscle cramps.  At that time hemoglobin A1c was 6.0, lipids were elevated and she was started on pravastatin 20 mg daily which she is tolerating well.  She is very anxious and very concerned about multiple issues related to her health and had multiple questions today.  She also skipped to the Prolia dose in January because of the some dental work planned.  Her last dose of Prolia now was in July 2016.  She had multiple questions if she can stop taking Prolia, any treatment for osteoporosis, cholesterol medications.  She would rather take turmeric, cinnamon and other naturopatic drugs and food like diandra.    Healthy Habits:   Regular Exercise: Yes  Sunscreen Use: Yes  Healthy Diet: Yes  Dental Visits Regularly: Yes  Seat Belt: Yes  Immunization status: up to date and documented.    Health Maintenance   Topic Date Due     COLONOSCOPY  03/29/2017     INFLUENZA VACCINE RULE BASED (1) 08/01/2017     MAMMOGRAM  02/01/2018     FALL RISK ASSESSMENT  05/31/2018     DXA SCAN  08/08/2018     ADVANCE DIRECTIVES DISCUSSED WITH PATIENT  05/31/2022     TD 18+ HE  07/24/2022     PNEUMOCOCCAL POLYSACCHARIDE VACCINE AGE 65 AND OVER  Completed     PNEUMOCOCCAL CONJUGATE VACCINE FOR ADULTS (PCV13 OR PREVNAR)  Completed     ZOSTER VACCINE  Completed       Social History     Social History     Marital status:      Spouse name: N/A     Number of children: N/A     Years of education: N/A     Occupational History     Not on file.     Social History Main Topics     Smoking status: Never Smoker     Smokeless tobacco: Not on file     Alcohol use No     Drug use: No     Sexual activity: No     Other Topics Concern     Not on file     Social History Narrative       Family History   Problem Relation Age of Onset     Hypertension Mother      Heart disease Sister      Hypertension Sister      Diabetes Sister      Asthma Brother       "Prostate cancer Brother      Heart disease Brother      Hypertension Brother      Alzheimer's disease Sister        Patient Active Problem List   Diagnosis     Osteoporosis Senile     Vitamin D Deficiency     Hyperlipemia     Prediabetes     Alpha Thalassemia Trait       Current Outpatient Prescriptions on File Prior to Visit   Medication Sig Dispense Refill     blood glucose meter (GLUCOMETER) Use 1 each As Directed as needed. Dispense glucometer brand per patient's insurance at pharmacy discretion. 1 each 0     blood glucose test strips Test blood sugar twice daily. Dispense brand per patient's insurance at pharmacy discretion. 100 each 11     blood glucose test strips Use 1 each As Directed as needed. Dispense brand per patient's insurance at pharmacy discretion. 100 each 11     calcium-vitamin D (CALCIUM-VITAMIN D) 500 mg(1,250mg) -200 unit per tablet Take 1 tablet by mouth 2 (two) times a day with meals.       cholecalciferol, vitamin D3, (VITAMIN D3) 2,000 unit Tab Take 5,000 Units by mouth daily.        generic lancets (ACCU-CHEK FASTCLIX) Test blood sugar twice daily. Dispense brand per patient's insurance at pharmacy discretion. 100 each 11     magnesium 200 mg Tab Take by mouth.       pravastatin (PRAVACHOL) 20 MG tablet Take 1 tablet (20 mg total) by mouth at bedtime. 90 tablet 3     VIT C/VIT E ACETATE/LUTEIN/MIN (OCUVITE LUTEIN ORAL) Take 1 capsule by mouth daily.       Current Facility-Administered Medications on File Prior to Visit   Medication Dose Route Frequency Provider Last Rate Last Dose     denosumab 60 mg (PROLIA 60 mg/ml)  60 mg Subcutaneous Q6 Months Olinda Wu MD   60 mg at 07/06/16 1008       Review of Systems  A 12 point comprehensive review of systems was negative except as noted in HPI.         Objective:      /70  Pulse 68  Ht 5' 9.5\" (1.765 m)  Wt 112 lb 1.6 oz (50.8 kg)  BMI 16.32 kg/m2    Body mass index is 16.32 kg/(m^2).        Physical Exam:  General " Appearance: Alert, cooperative, no distress, appears stated age.  Head: Normocephalic, without obvious abnormality, atraumatic  Eyes: PERRL, conjunctiva/corneas clear, EOM's intact  Ears: Normal TM's and external ear canals, both ears  Nose: Nares normal, septum midline,mucosa normal, no drainage  Throat: Lips, mucosa, and tongue normal; teeth and gums normal  Neck: Supple, symmetrical, trachea midline, no adenopathy;  thyroid: not enlarged, symmetric, no tenderness/mass/nodules; no carotid bruit or JVD  Back: Symmetric, no curvature, ROM normal, no CVA tenderness.  Lungs: Clear to auscultation bilaterally, respirations unlabored.  Breasts: No breast masses, tenderness, asymmetry, or nipple discharge.  Heart: Regular rate and rhythm, S1 and S2 normal, no murmur, rub, or gallop.  Abdomen: Soft, non-tender, bowel sounds active all four quadrants,  no masses, no organomegaly.  Pelvic:declined  Extremities: Extremities normal, atraumatic, no cyanosis or edema.  Skin: Skin color, texture, turgor normal, no rashes or lesions.  Lymph nodes: Cervical, supraclavicular, and axillary nodes normal.  Neurologic: No focal neurological findings.

## 2021-06-14 NOTE — PROGRESS NOTES
ASSESSMENT and PLAN:  1. Prediabetes  Sounds like this is well controlled, but she'll return for future labs.  - Comprehensive Metabolic Panel; Future  - Glycosylated Hemoglobin A1c; Future    2. Hyperlipemia  She's been off of statins since mid-Oct.  She's treating w/ herbals.  - Lipid Cascade; Future    3. Fatigue  I suspect this is related more to depression and from her poor sleep.  We'll check labs.  - Comprehensive Metabolic Panel; Future  - Thyroid Cascade; Future  - HM2(CBC w/o Differential); Future      There are no discontinued medications.    No Follow-up on file.    Patient Instructions   Let's plan on labs for your cholesterol and fatigue and then a follow up with me after those are back.    Today's labs will be available on [employer].  If you aren't signed up, then we'll mail them out.  If anything needs more immediate follow up, we'll also call.    Take care!      CHIEF COMPLAINT:  Chief Complaint   Patient presents with     Fatigue     Not feeling well, Under a lot of stress     Medication Problem     Pt thinks she has cramping in legs due to statin     Eye Problem     Twitching in left eye off and on       HISTORY OF PRESENT ILLNESS:  Davis Mesa is a 72 y.o. female  presenting to the clinic today for evaluation of a few symptoms.    She has been fatigued.  She is not feeling well.  She admits that she is under significant amount of stress.  She is taking care of her older sister who has dementia and is also blind.  Her sister protected her and her kids during WWII.  She also helped them when they came to the [facility].  Her sister was staying with another sister (with Polio), but was falling.  She's taking care of her in her own home now for the winter.  As a result of this, she is not getting good sleep at night.  She has to get up to take her sister to the restroom.  She's been managing stress w/ mediatation and prayer.  She's avoiding the news.  She's not interested in taking medication for  depression.    She denies CP or SOB.  Her fasting blood sugars are under 100 in the AM.  She hasn't noted any blood loss.    She is concerned that she is having muscle cramping in her legs related to her pravastatin.  She stopped it (around mid Oct) and has been taking garlic with raw honey as well as ginger and ACV.      Her final concern is that she has had twitching of her left eye off and on.  It gets better with eye drops.  Her vision is fine.    REVIEW OF SYSTEMS:    All other systems are negative.    PFSH:  Family History   Problem Relation Age of Onset     Hypertension Mother      Heart disease Sister      Hypertension Sister      Diabetes Sister      Asthma Brother      Prostate cancer Brother      Heart disease Brother      Hypertension Brother      Alzheimer's disease Sister      TOBACCO USE:  History   Smoking Status     Never Smoker   Smokeless Tobacco     Not on file       VITALS:  Vitals:    12/13/17 1445   BP: 112/60   Patient Site: Right Arm   Pulse: 84   Weight: 110 lb 7 oz (50.1 kg)     Wt Readings from Last 3 Encounters:   12/13/17 110 lb 7 oz (50.1 kg)   07/27/17 112 lb 1.6 oz (50.8 kg)   05/31/17 112 lb 3 oz (50.9 kg)     PHYSICAL EXAM:  Constitutional:  Reveals an alert, pleasant elderly female.   Vitals:  Noted.   Psych: pleasant, oriented, alert    MEDICATIONS:  Current Outpatient Prescriptions   Medication Sig Dispense Refill     blood glucose meter (GLUCOMETER) Use 1 each As Directed as needed. Dispense glucometer brand per patient's insurance at pharmacy discretion. 1 each 0     blood glucose test strips Test blood sugar twice daily. Dispense brand per patient's insurance at pharmacy discretion. 100 each 11     blood glucose test strips Use 1 each As Directed as needed. Dispense brand per patient's insurance at pharmacy discretion. 100 each 11     calcium-vitamin D (CALCIUM-VITAMIN D) 500 mg(1,250mg) -200 unit per tablet Take 1 tablet by mouth 2 (two) times a day with meals.        cholecalciferol, vitamin D3, (VITAMIN D3) 2,000 unit Tab Take 5,000 Units by mouth daily.        generic lancets (ACCU-CHEK FASTCLIX) Test blood sugar twice daily. Dispense brand per patient's insurance at pharmacy discretion. 100 each 11     magnesium 200 mg Tab Take by mouth.       VIT C/VIT E ACETATE/LUTEIN/MIN (OCUVITE LUTEIN ORAL) Take 1 capsule by mouth daily.       pravastatin (PRAVACHOL) 20 MG tablet Take 1 tablet (20 mg total) by mouth at bedtime. 90 tablet 3     Current Facility-Administered Medications   Medication Dose Route Frequency Provider Last Rate Last Dose     denosumab 60 mg (PROLIA 60 mg/ml)  60 mg Subcutaneous Q6 Months Olinda Wu MD   60 mg at 07/27/17 0937

## 2021-06-15 NOTE — PROGRESS NOTES
ASSESSMENT and PLAN:  1. Alpha Thalassemia Trait  Her hemoglobin is low but essentially stable.  We can monitor this periodically.    2. Hyperlipemia  I reviewed her risk with her.  I gave her a printout of her risk from the [facility] statin decision guideline.  She would like to give herself another 3 months to work out her diet and exercise routine in hopes of lowering her cholesterol that way.  We did discuss a trial of Crestor if her cholesterol is still elevated at that time.    There are no discontinued medications.    No Follow-up on file.    Patient Instructions   Plan on a three month follow for cholesterol.      If we do decide to go back on a medication, I would try crestor.    Take care, stay warm!!!      CHIEF COMPLAINT:  Chief Complaint   Patient presents with     Follow-up     Cholesterol labs       HISTORY OF PRESENT ILLNESS:  Davis Mesa is a 72 y.o. female  presenting to the clinic today for a follow-up discussion on her cholesterol.  This was elevated when it was last checked.  She is off of pravastatin now.  She stopped it on her own because she was having severe muscle cramps in her feet with that.  She had also tried and failed atorvastatin as well as simvastatin.  She has never been on Crestor.  I reviewed the guidelines from the [facility] regarding cholesterol management.  She would consider taking a cholesterol-lowering medication but she would like to wait another 3 months to see if she can make improvements further with her diet.  We did discuss a trial of Crestor should her cholesterol come back again elevated and she would be open to that.    We reviewed her low blood count.  She said that that is stable given her alpha thalassemia trait.  Looking back her blood counts have been stable in this range for some time.  She is having no symptoms of blood loss.      PFSH:  Her sister was recently taken to the emergency room after a brief episode of loss of consciousness.  Nothing was found  as a cause.      TOBACCO USE:  History   Smoking Status     Never Smoker   Smokeless Tobacco     Not on file       VITALS:  Vitals:    12/27/17 1119   BP: 122/62   Patient Site: Right Arm   Pulse: 80   Weight: 110 lb (49.9 kg)     Wt Readings from Last 3 Encounters:   12/27/17 110 lb (49.9 kg)   12/13/17 110 lb 7 oz (50.1 kg)   07/27/17 112 lb 1.6 oz (50.8 kg)         PHYSICAL EXAM:  Constitutional:  Reveals an alert, pleasant adult female.   Vitals:  Noted.       MEDICATIONS:  Current Outpatient Prescriptions   Medication Sig Dispense Refill     blood glucose meter (GLUCOMETER) Use 1 each As Directed as needed. Dispense glucometer brand per patient's insurance at pharmacy discretion. 1 each 0     blood glucose test strips Test blood sugar twice daily. Dispense brand per patient's insurance at pharmacy discretion. 100 each 11     blood glucose test strips Use 1 each As Directed as needed. Dispense brand per patient's insurance at pharmacy discretion. 100 each 11     calcium-vitamin D (CALCIUM-VITAMIN D) 500 mg(1,250mg) -200 unit per tablet Take 1 tablet by mouth 2 (two) times a day with meals.       cholecalciferol, vitamin D3, (VITAMIN D3) 2,000 unit Tab Take 5,000 Units by mouth daily.        generic lancets (ACCU-CHEK FASTCLIX) Test blood sugar twice daily. Dispense brand per patient's insurance at pharmacy discretion. 100 each 11     magnesium 200 mg Tab Take by mouth.       pravastatin (PRAVACHOL) 20 MG tablet Take 1 tablet (20 mg total) by mouth at bedtime. 90 tablet 3     VIT C/VIT E ACETATE/LUTEIN/MIN (OCUVITE LUTEIN ORAL) Take 1 capsule by mouth daily.       Current Facility-Administered Medications   Medication Dose Route Frequency Provider Last Rate Last Dose     denosumab 60 mg (PROLIA 60 mg/ml)  60 mg Subcutaneous Q6 Months Olinda Wu MD   60 mg at 07/27/17 0937

## 2021-06-17 NOTE — PATIENT INSTRUCTIONS - HE
Patient Instructions by Suma Zavaleta FNP at 5/2/2019  8:50 AM     Author: Suma Zavaleta FNP Service: -- Author Type: Nurse Practitioner    Filed: 5/2/2019  9:12 AM Encounter Date: 5/2/2019 Status: Signed    : Suma Zavaleta FNP (Nurse Practitioner)       Patient Education     Adult Self-Care for Colds    Colds are caused by viruses. They can't be cured with antibiotics. However, you can ease symptoms and support your body's efforts to heal itself. No matter which symptoms you have, be sure to:    Drink plenty of fluids (water or clear soup)    Stop smoking and drinking alcohol    Get plenty of rest  Understand a fever    Take your temperature several times a day. If your fever is 100.4 F (38.0 C) for more than a day, call your healthcare provider.    Relax, lie down. Go to bed if you want. Just get off your feet and rest. Also, drink plenty of fluids to avoid dehydration.    Take acetaminophen or a nonsteroidal anti-inflammatory agent (NSAID), such as ibuprofen.  Treat a troubled nose kindly    Breathe steam or heated humidified air to open blocked nasal passages.  a hot shower or use a vaporizer. Be careful not to get burned by the steam.    Saline nasal sprays and decongestant tablets help open a stuffy nose. Antihistamines can also help, but they can cause side effects such as drowsiness and drying of the eyes, nose, and mouth.  Soothe a sore throat and cough    Gargle every 2 hours with 1/4 teaspoon of salt dissolved in 1/2 cup of warm water. Suck on throat lozenges and cough drops to moisten your throat.    Cough medicines are available but it is unclear how well they actually work.    Take acetaminophen or an NSAID, such as ibuprofen, to ease throat pain  Ease digestive problems    Put fluids back into your body. Take frequent sips of clear liquids such as water or broth. Avoid drinks that have a lot of sugar in them, such as juices and sodas. These can make diarrhea worse.  Older children and adults can drink sports drinks.    As your appetite returns, you can resume your normal diet. Ask your healthcare provider if there are any foods you should avoid.  When to seek medical care  When you first notice symptoms, ask your healthcare provider if antiviral medicines are appropriate. Antibiotics should not be taken for colds or flu. Also, call your healthcare provider if you have any of the following symptoms or if you aren't feeling better after 7 days:    Shortness of breath    Pain or pressure in the chest or belly (abdomen)    Worsening symptoms, especially after a period of improvement    Fever of 100.4 F  (38.0 C) or higher, or fever that doesn't go down with medicine    Sudden dizziness or confusion    Severe or continued vomiting    Signs of dehydration, including extreme thirst, dark urine, infrequent urination, dry mouth    Spotted, red, or very sore throat   Date Last Reviewed: 12/1/2016 2000-2017 [employer]. [address] [place] [id number]. All rights reserved. This information is not intended as a substitute for professional medical care. Always follow your healthcare professional's instructions.

## 2021-06-18 NOTE — PROGRESS NOTES
"  1. Osteoporosis Senile     2. Hyperlipemia  pravastatin (PRAVACHOL) 20 MG tablet       Return in about 6 months (around 12/7/2018) for Annual physical.    Patient Instructions   Prolia 9th today.  Prolia 10th in 6 months with me and CPE at that time.    DXA in August 2018 .   Phone number to schedule [phone].    Daily calcium need is 2629-5949 mg a day from the diet and supplements.  Calcium citrate is easier to digest.  Vitamin D 2000 IU daily recommended.      Chief Complaint   Patient presents with     Follow-up       /52  Pulse 92  Temp 98.3  F (36.8  C) (Oral)   Resp 20  Ht 5' 9.5\" (1.765 m)  Wt 111 lb 9.6 oz (50.6 kg)  BMI 16.24 kg/m2      Did you experience any problems with previous Prolia injection? no  Any medication change in the last 6 months? no  Did you take prednisone or other immunosupressant drugs in the last 6 months   (chemo, transplant, rheum, dermatology conditions)? no  Did you have any serious infection in the last 6 months?no  Any recent hospitalizations?no  Do you plan any dental work in the next 2-3 months?no  How much calcium do you take daily from the diet and supplements?1200 mg  How much vit D do you take daily? 2000 IU  Last DXA? 8/2016      Patient is here today for the 9th Prolia injection and follow up lab results from last week. Patient tolerated previous injections well, was for some reason she skipped her Prolia dose inJanuary of this year.  She thinks that she had some dental work planned but after that she forgot about getting the Prolia injection.  We did review all her lab results.  She will continue same supplements.  She will restart the pravastatin that she did not take for a long time and her LDL went up to 180. we discussed calcium and vit D daily needs today.   Next Prolia injection will be in 6 months and CPE.     15 minutes spent with the patient and more then 50 % of the time in counseling.  This note has been dictated using voice recognition " software. Any grammatical or context distortions are unintentional and inherent to the software      Patient Active Problem List   Diagnosis     Osteoporosis Senile     Vitamin D Deficiency     Hyperlipemia     Prediabetes     Alpha Thalassemia Trait       Current Outpatient Prescriptions on File Prior to Visit   Medication Sig Dispense Refill     blood glucose meter (GLUCOMETER) Use 1 each As Directed as needed. Dispense glucometer brand per patient's insurance at pharmacy discretion. 1 each 0     blood glucose test strips Test blood sugar twice daily. Dispense brand per patient's insurance at pharmacy discretion. 100 each 11     blood glucose test strips Use 1 each As Directed as needed. Dispense brand per patient's insurance at pharmacy discretion. 100 each 11     calcium-vitamin D (CALCIUM-VITAMIN D) 500 mg(1,250mg) -200 unit per tablet Take 1 tablet by mouth 2 (two) times a day with meals.       cholecalciferol, vitamin D3, (VITAMIN D3) 2,000 unit Tab Take 5,000 Units by mouth daily.        generic lancets (ACCU-CHEK FASTCLIX) Test blood sugar twice daily. Dispense brand per patient's insurance at pharmacy discretion. 100 each 11     magnesium 200 mg Tab Take by mouth.       VIT C/VIT E ACETATE/LUTEIN/MIN (OCUVITE LUTEIN ORAL) Take 1 capsule by mouth daily.       [DISCONTINUED] pravastatin (PRAVACHOL) 20 MG tablet Take 1 tablet (20 mg total) by mouth at bedtime. 90 tablet 3     Current Facility-Administered Medications on File Prior to Visit   Medication Dose Route Frequency Provider Last Rate Last Dose     denosumab 60 mg (PROLIA 60 mg/ml)  60 mg Subcutaneous Q6 Months Olinda Wu MD   60 mg at 07/27/17 0937

## 2021-06-18 NOTE — PROGRESS NOTES
Clinic Note    Assessment:     Assessment and Plan:  1. Folliculitis  She was worried about a possible tick bite on her scalp but this looks like folliculitis to me. Plan to treat with doxycycline for ten days. Follow up as needed.      Patient Instructions   I think that you have folliculitis of the scalp. We can treat this with an oral antibiotic called doxycycline. If you are worried about lyme disease, the doxycycline will treat this as well.    Take the doxycycline tablet twice daily for the next ten days.     May sure to wash your scalp with soap and water daily.     Come back to see me after ten days if you are not starting to feel better.     No Follow-up on file.         Subjective:      Davis Mesa is a 73 y.o. female who comes in the clinic with a wound on her scalp.     Patient first noticed a sore spot on the top of her scalp a few days ago.  She had her  look at the wound and he mentioned that it looked like a large pimple was forming on her scalp.  Patient says that she spends quite a lot of time outside and is worried that it may be a tick bite.  Patient has not had fevers.  No joint pain or swelling.  No headaches or dizziness.    The following portions of the patient's history were reviewed and updated as appropriate: Allergies, medications, problem list.     Review of Systems:    Review is otherwise negative except for what is mentioned above.     Social Hx:    History   Smoking Status     Never Smoker   Smokeless Tobacco     Never Used         Objective:     Vitals:    06/15/18 1251   BP: 124/70   Pulse: 74   Weight: 111 lb 9.6 oz (50.6 kg)       Exam:    General: No apparent distress. Calm. Alert and Oriented X3. Pt behavior is appropriate.  Skin: There is a small, 1 cm, circumscribed raised maculopapular pimple-like lesion on the top of the patient's scalp.  It is painful to the touch.  No bleeding.  No open areas or bruising.  Small amounts of erythema present.      Patient Active  Problem List   Diagnosis     Osteoporosis Senile     Vitamin D Deficiency     Hyperlipemia     Prediabetes     Alpha Thalassemia Trait     Current Outpatient Prescriptions   Medication Sig Dispense Refill     blood glucose meter (GLUCOMETER) Use 1 each As Directed as needed. Dispense glucometer brand per patient's insurance at pharmacy discretion. 1 each 0     blood glucose test strips Test blood sugar twice daily. Dispense brand per patient's insurance at pharmacy discretion. 100 each 11     blood glucose test strips Use 1 each As Directed as needed. Dispense brand per patient's insurance at pharmacy discretion. 100 each 11     calcium-vitamin D (CALCIUM-VITAMIN D) 500 mg(1,250mg) -200 unit per tablet Take 1 tablet by mouth 2 (two) times a day with meals.       cholecalciferol, vitamin D3, (VITAMIN D3) 2,000 unit Tab Take 5,000 Units by mouth daily.        generic lancets (ACCU-CHEK FASTCLIX) Test blood sugar twice daily. Dispense brand per patient's insurance at pharmacy discretion. 100 each 11     magnesium 200 mg Tab Take by mouth.       pravastatin (PRAVACHOL) 20 MG tablet Take 1 tablet (20 mg total) by mouth at bedtime. 90 tablet 3     VIT C/VIT E ACETATE/LUTEIN/MIN (OCUVITE LUTEIN ORAL) Take 1 capsule by mouth daily.       Current Facility-Administered Medications   Medication Dose Route Frequency Provider Last Rate Last Dose     denosumab 60 mg (PROLIA 60 mg/ml)  60 mg Subcutaneous Q6 Months Olinda Wu MD   60 mg at 07/27/17 0937     denosumab 60 mg (PROLIA 60 mg/ml)  60 mg Subcutaneous Q6 Months Lottie Vergara MD   60 mg at 06/07/18 1301       I spent 15 minutes with patient face to face, of which >50% was counseling regarding the above plan       Viktor Smith (Rob), ARCENIO    6/15/2018

## 2021-06-19 NOTE — LETTER
Letter by Enoch Martinez MD at      Author: Enoch Martinez MD Service: -- Author Type: --    Filed:  Encounter Date: 3/20/2019 Status: (Other)         Davis Mesa  [address] [facility] [address]             March 20, 2019         Dear Ms. Mesa,    Below are the results from your recent visit:    Resulted Orders   CT Cardiac Calcium Score   Result Value Ref Range    Agatston Score of Left Main 0     Agatston Score of the Left Anterior Descending 128     Agatston Score of Circumflex 0     Agatston Score of Right Coronary Artery 22     Total Score 150.00     Narrative      The total Agatston calcium score is 150 distributing in LAD and RCA. A   calcium score in this range places the individual in the 75th percentile   when compared to an age and gender matched control group and implies a   high risk of cardiac events in the next ten years.     Comments:   Aggressive risk factor control.         Dr. Tiffanie Tong is no longer practicing at this clinic.  You will need to discuss the results of this heart scan with your new primary physician.    Please call with questions or contact us using [employer].    Sincerely,        Electronically signed by Enoch Martinez MD

## 2021-06-21 NOTE — PROGRESS NOTES
ASSESSMENT/PLAN:   1. UTI symptoms  Urinalysis-UC if Indicated    cefdinir (OMNICEF) 300 MG capsule    Culture, Urine   2. Pyuria         The patient symptomatic of UTI and UA shows pyuria.  Culture pending.  Clinically, this is not pyelonephritis, urosepsis given no fevers. Nothing on history to suggest kidney stone, ovarian torsion, appendicitis, diverticulitis, kidney injury, glomerular bleeding, or any other urgent or emergent condition.     The patient is vitally stable and appropriate for outpatient antibiotic therapy. I have prescribed omnicef for the patient.  Can be contacted once culture results are available should change be necessary.  I educated the patient to drink plenty of fluids and to take a probiotic while taking antibiotics.    I educated the patient on warning signs for immediate follow up including fevers/chills, nausea, vomiting, hematuria, abdominal pain, altered mental status. I educated the patient to otherwise follow up with primary care doctor as needed or if symptoms do not improve. Please view below patient instructions for patient education and return precautions as were discussed during visit.  Patient understood and agreed. Patient was stable for discharge.      Patient Instructions:  Patient Instructions   Your urine test shows evidence of a urinary tract infection.    We will treat you with an antibiotic, cefdinir.  I sent this to your pharmacy. Please take as directed for 7 days. Please take a probiotic or eat a daily Greek yogurt while you are on the antibiotic.    If developing high fevers, vomiting, abdominal pain, or any other new, concerning symptoms, come back immediately. If no improvement in symptoms by the end of your antibiotic treatment, follow up with your primary care doctor.    Otherwise, simply follow up as needed.        Urinary Tract Infections in Women  Urinary tract infections (UTIs) are most often caused by bacteria (germs). These bacteria enter the urinary  tract. The bacteria may come from outside the body. Or they may travel from the skin outside the rectum or vagina into the urethra. Female anatomy makes it easier for bacteria from the bowel to enter a woman s urinary tract, which is the most common source of UTI. This means women develop UTIs more often than men. Pain in or around the urinary tract is a common UTI symptom. But the only way to know for sure if you have a UTI for the health care provider to test your urine. The two tests that may be done are the urinalysis and urine culture.  Types of UTIs    Cystitis: A bladder infection (cystitis) is the most common UTI in women. You may have urgent or frequent urination. You may also have pain, burning when you urinate, and bloody urine.    Urethritis: This is an inflamed urethra, which is the tube that carries urine from the bladder to outside the body. You may have lower stomach or back pain. You may also have urgent or frequent urination.    Pyelonephritis: This is a kidney infection. If not treated, it can be serious and damage your kidneys. In severe cases, you may be hospitalized. You may have a fever and lower back pain.  Medications to treat a UTI  Most UTIs are treated with antibiotics. These kill the bacteria. The length of time you need to take them depends on the type of infection. It may be as short as 3 days. If you have repeated UTIs, a low-dose antibiotic may be needed for several months. Take antibiotics exactly as directed. Don t stop taking them until all of the medication is gone. If you stop taking the antibiotic too soon, the infection may not go away, and you may develop a resistance to the antibiotic. This can make it much harder to treat.  Lifestyle changes to treat and prevent UTIs  The lifestyle changes below will help get rid of your UTI. They may also help prevent future UTIs.    Drink plenty of fluids. This includes water, juice, or other caffeine-free drinks. Fluids help flush  bacteria out of your body.    Empty your bladder. Always empty your bladder when you feel the urge to urinate. And always urinate before going to sleep. Urine that stays in your bladder can lead to infection. Try to urinate before and after sex as well.    Practice good personal hygiene. Wipe yourself from front to back after using the toilet. This helps keep bacteria from getting into the urethra.    Use condoms during sex. These help prevent UTIs caused by sexually transmitted bacteria. Also, avoid using spermicides during sex. These can increase the risk of UTIs. Choose other forms of birth control instead. For women who tend to get UTIs after sex, a low-dose of a preventive antibiotic may be used. Be sure to discuss this option with your health care provider.    Follow up with your health care provider as directed. He or she may test to make sure the infection has cleared. If necessary, additional treatment may be started.    1062-2184 The [email or web address]. [address] [place] [address]. All rights reserved. This information is not intended as a substitute for professional medical care. Always follow your healthcare professional's instructions.                  SUBJECTIVE:   Davis Mesa is a 73 y.o. female with a history of microscopic hematuria followed by urology who presents today for evaluation of burning with urination for the past 4-5 days.  Also reports increased frequency of urination and increased urgency.  She denies any gross hematuria.  Denies abdominal, flank or back pain.  No fevers.  No nausea or vomiting.  Denies vaginal symptoms.  No history of frequent UTIs.  Has tried pushing fluids, cinnamon supplement for symptoms without significant relief.    Past Medical History:  Patient Active Problem List   Diagnosis     Osteoporosis Senile     Vitamin D Deficiency     Hyperlipemia     Prediabetes     Alpha Thalassemia Trait       Surgical History:    Reviewed;  Non-contributory    Family History:  Family History   Problem Relation Age of Onset     Hypertension Mother      Heart disease Sister      Hypertension Sister      Diabetes Sister      Asthma Brother      Prostate cancer Brother      Heart disease Brother      Hypertension Brother      Alzheimer's disease Sister        Reviewed; Non-contributory      Social History:    History   Smoking Status     Never Smoker   Smokeless Tobacco     Never Used     Smoking:  Alcohol use:  Other drug use:  Occupation:      Smoke exposure:  :  Living situation:    Current Medications:  Current Outpatient Prescriptions on File Prior to Visit   Medication Sig Dispense Refill     blood glucose meter (GLUCOMETER) Use 1 each As Directed as needed. Dispense glucometer brand per patient's insurance at pharmacy discretion. 1 each 0     blood glucose test strips Use 1 each As Directed as needed. Dispense brand per patient's insurance at pharmacy discretion. 100 strip 3     calcium-vitamin D (CALCIUM-VITAMIN D) 500 mg(1,250mg) -200 unit per tablet Take 1 tablet by mouth 2 (two) times a day with meals.       magnesium 200 mg Tab Take by mouth.       VIT C/VIT E ACETATE/LUTEIN/MIN (OCUVITE LUTEIN ORAL) Take 1 capsule by mouth daily.       blood glucose test strips Mary Accucheck Test Strips. Test blood sugar twice daily. Dispense brand per patient's insurance at pharmacy discretion. DX R73.09 100 strip 11     blood glucose test strips Use 1 each As Directed 2 (two) times a day. Use as directed. Test 2 times daily. Dx: R73.9 Non insulin dependant. 200 strip 3     blood-glucose meter Misc Use as directed. Test 2 times daily. Dx: R73.9 Non insulin dependant. 1 each 0     cholecalciferol, vitamin D3, (VITAMIN D3) 2,000 unit Tab Take 5,000 Units by mouth daily.        generic lancets (ACCU-CHEK FASTCLIX) Test blood sugar twice daily. Dispense brand per patient's insurance at pharmacy discretion. 100 each 11     pravastatin (PRAVACHOL) 20 MG  tablet Take 1 tablet (20 mg total) by mouth at bedtime. 90 tablet 3     Current Facility-Administered Medications on File Prior to Visit   Medication Dose Route Frequency Provider Last Rate Last Dose     denosumab 60 mg (PROLIA 60 mg/ml)  60 mg Subcutaneous Q6 Months Olinda Wu MD   60 mg at 07/27/17 0937     denosumab 60 mg (PROLIA 60 mg/ml)  60 mg Subcutaneous Q6 Months Lottie Vergara MD   60 mg at 06/07/18 1301       Allergies:   Allergies   Allergen Reactions     Codeine      Sulfa (Sulfonamide Antibiotics)        I personally reviewed patient's past medical, surgical, social, family history and allergies.    ROS:  Review of Systems  An 8point review of systems was conducted and otherwise negative unless noted in HPI.          OBJECTIVE:   /60  Pulse 70  Temp 97.9  F (36.6  C) (Oral)   Resp 16  Wt 113 lb (51.3 kg)  SpO2 98%  BMI 16.45 kg/m2      General Appearance:  Alert, well-appearing female in NAD. Afebrile.    Integument: Warm, dry  HEENT: Moist mucus membranes.  Respiratory: No distress. Lungs clear to ausculation bilaterally. No crackles, wheezes, rhonchi or stridor.  Cardiovascular: Regular rate and rhythm, no murmur, rub or gallop. No obvious chest wall deformities. Peripheral pulses 2+ bilaterally. No peripheral edema.  GI: Soft, nontender, normal bowel sounds. No masses, organomegaly, rigidity, or guarding. No CVAT.  : deferred  Neurologic: Alert and orientated appropriately. No focal deficits. Follows commands.          Radiology:  I personally ordered and viewed this study. I agree with below radiology findings.    none    Laboratory Studies:  I personally ordered and interpreted these studies.    Results for orders placed or performed in visit on 10/18/18   Urinalysis-UC if Indicated   Result Value Ref Range    Color, UA Yellow Colorless, Yellow, Straw, Light Yellow    Clarity, UA Clear Clear    Glucose, UA Negative Negative    Bilirubin, UA Negative Negative    Ketones, UA  Negative Negative    Specific Gravity, UA >=1.030 1.005 - 1.030    Blood, UA Moderate (!) Negative    pH, UA 5.5 5.0 - 8.0    Protein, UA Negative Negative mg/dL    Urobilinogen, UA 0.2 E.U./dL 0.2 E.U./dL, 1.0 E.U./dL    Nitrite, UA Negative Negative    Leukocytes, UA Small (!) Negative    Bacteria, UA Few (!) None Seen hpf    RBC, UA 0-2 None Seen, 0-2 hpf    WBC, UA 5-10 (!) None Seen, 0-5 hpf    Squam Epithel, UA 0-5 None Seen, 0-5 lpf       Nisha Corrigan, CNP

## 2021-06-22 NOTE — PROGRESS NOTES
1.  When was the last injection?  6/7/18     2.  Has insurance for this injection been verified?  Yes     3.  Did you experience any new onset achiness or rashes that lasted for over a month with your previous Prolia injection? NO    4.  Do you have a fever over 101?F or a new deep cough that is unusual for you today? NO    5.  Have you started any new medications in the last 6 months that you were told could affect your immune system? These may have been prescribed by oncologist, transplant, rheumatology, or dermatology. No    6.  In the last 6 months have you have gastric bypass or parathyroid surgery? No    7.  Do you plan dental work requiring drilling into the bone such as implants/extractions or oral surgery in the next 2-3 months? NO      If symptoms discussed above present prior to your injection appointment, you are to notify clinic immediately.     Please answer questions 3-7 with yes or no answers.     Thank You,      Pt is aware to Follow up with Dr. Vergara in 6 months for Ostea/Prolia review.    Pt given both info packet and Info tear off sheet regarding Prolia.

## 2021-06-22 NOTE — PROGRESS NOTES
Assessment and Plan:     1. Osteoporosis Senile  Stable on prolia.  She'll continue to f/u w/ Dr. Vergara.  We'll see if her PA (if needed) is processed as she's coming due.  If it's ok, then she'll get her shot today; if not, then we'll have her schedule f/u.    2. Vitamin D deficiency  Labs today.  - Vitamin D, Total (25-Hydroxy)    3. Prediabetes  Her sugars are under good control by her report.  Labs today  - Comprehensive Metabolic Panel  - Glycosylated Hemoglobin A1c    4. Other hyperlipidemia  She's too fearful of the SE of possible dementia to take a statin.  - Lipid Cascade  - Comprehensive Metabolic Panel    5. Routine general medical examination at a health care facility  She's going to work on updating her health care directive.  She'll continue Q2 yr mammo.  Flu shot already done.    6. Alpha Thalassemia Trait  She's worried she might be anemic and wants to see her HGb.  - HM2(CBC w/o Differential)        The patient's current medical problems were reviewed.    The following low BMI interventions were performed this visit: lifestyle education regarding diet.  She's already working on her diet for preDM and lipids.  The following health maintenance schedule was reviewed with the patient and provided in printed form in the after visit summary:   Health Maintenance   Topic Date Due     ZOSTER VACCINES (2 of 3) 01/24/2017     COLONOSCOPY  06/07/2019 (Originally 3/29/2017)     FALL RISK ASSESSMENT  06/07/2019     MAMMOGRAM  09/26/2019     DXA SCAN  08/09/2020     ADVANCE DIRECTIVES DISCUSSED WITH PATIENT  05/31/2022     TD 18+ HE  07/24/2022     PNEUMOCOCCAL POLYSACCHARIDE VACCINE AGE 65 AND OVER  Completed     INFLUENZA VACCINE RULE BASED  Completed     PNEUMOCOCCAL CONJUGATE VACCINE FOR ADULTS (PCV13 OR PREVNAR)  Completed        Subjective:   Chief Complaint: Davis Mesa is an 73 y.o. female here for an Annual Wellness visit.     HPI:  She had a bone density scan on 8/9/18.  It showed low bone density,  stable on her current tx (prolia).  She recently lost two sisters in the past 5 months.  She feels sad but does not feel that it is anything out of the normal.  She is able to get herself feeling better and upbeat.    She has a history of hyperglycemia.  She periodically checks fasting blood sugars and within the last 2 weeks they have been excellent.  The highest she seen is a 101 she is working to manage that with diet.  She will not take a statin medication.  2 of her sisters took statins.  They both developed late onset dementia.  She is fearful that it was a side effect of the statin medication.    She reports a remote history of microscopic hematuria.  She had a workup with urology including cystoscopy.    Review of Systems:  Skin: stable spot left groin    Please see above.  The rest of the review of systems are negative for all systems.    Patient Care Team:  Lottie Vergara MD as PCP - General (Internal Medicine)     Patient Active Problem List   Diagnosis     Osteoporosis Senile     Vitamin D Deficiency     Hyperlipemia     Prediabetes     Alpha Thalassemia Trait     No past medical history on file.   No past surgical history on file.   Family History   Problem Relation Age of Onset     Hypertension Mother      Heart disease Sister      Hypertension Sister      Diabetes Sister      Asthma Brother      Prostate cancer Brother      Heart disease Brother      Hypertension Brother      Alzheimer's disease Sister       Social History     Socioeconomic History     Marital status:      Spouse name: Not on file     Number of children: Not on file     Years of education: Not on file     Highest education level: Not on file   Social Needs     Financial resource strain: Not on file     Food insecurity - worry: Not on file     Food insecurity - inability: Not on file     Transportation needs - medical: Not on file     Transportation needs - non-medical: Not on file   Occupational History     Not on file    Tobacco Use     Smoking status: Never Smoker     Smokeless tobacco: Never Used   Substance and Sexual Activity     Alcohol use: No     Drug use: No     Sexual activity: No   Other Topics Concern     Not on file   Social History Narrative     Not on file      Current Outpatient Medications   Medication Sig Dispense Refill     blood glucose test strips Use 1 each As Directed 2 (two) times a day. Use as directed. Test 2 times daily. Dx: R73.9 Non insulin dependant. 200 strip 3     calcium-vitamin D (CALCIUM-VITAMIN D) 500 mg(1,250mg) -200 unit per tablet Take 1 tablet by mouth 2 (two) times a day with meals.       cholecalciferol, vitamin D3, (VITAMIN D3) 2,000 unit Tab Take 5,000 Units by mouth daily.        generic lancets (ACCU-CHEK FASTCLIX) Test blood sugar twice daily. Dispense brand per patient's insurance at pharmacy discretion. 100 each 11     magnesium 200 mg Tab Take by mouth.       VIT C/VIT E ACETATE/LUTEIN/MIN (OCUVITE LUTEIN ORAL) Take 1 capsule by mouth daily.       blood glucose meter (GLUCOMETER) Use 1 each As Directed as needed. Dispense glucometer brand per patient's insurance at pharmacy discretion. 1 each 0     blood glucose test strips Use 1 each As Directed as needed. Dispense brand per patient's insurance at pharmacy discretion. 100 strip 3     blood glucose test strips Mary Accucheck Test Strips. Test blood sugar twice daily. Dispense brand per patient's insurance at pharmacy discretion. DX R73.09 100 strip 11     blood-glucose meter Misc Use as directed. Test 2 times daily. Dx: R73.9 Non insulin dependant. 1 each 0     pravastatin (PRAVACHOL) 20 MG tablet Take 1 tablet (20 mg total) by mouth at bedtime. 90 tablet 3     Current Facility-Administered Medications   Medication Dose Route Frequency Provider Last Rate Last Dose     denosumab 60 mg (PROLIA 60 mg/ml)  60 mg Subcutaneous Q6 Months Olinda Wu MD   60 mg at 07/27/17 0937     denosumab 60 mg (PROLIA 60 mg/ml)  60 mg  "Subcutaneous Q6 Months Lottie Vergara MD   60 mg at 06/07/18 1301      Objective:   Vital Signs:   Visit Vitals  /56 (Patient Site: Right Arm, Patient Position: Sitting, Cuff Size: Adult Regular)   Pulse 68   Ht 5' 9.5\" (1.765 m)   Wt 112 lb (50.8 kg)   BMI 16.30 kg/m         VisionScreening:  No exam data present     PHYSICAL EXAM:  Constitutional:  Reveals an alert, pleasant adult female.   Vitals:  Noted.   Ears: TM's normal bilaterally   Eyes: PERRL, conjunctiva/corneas clear, EOM's intact   Throat: Lips, mucosa, and tongue normal; teeth and gums normal   Neck: Normal ROM, no carotid bruits, no thyromegaly   Lungs: Clear to auscultation bilaterally, respirations unlabored.   Breast exam:  Normal skin overlying her breasts bilaterally no discrete nodule is palpable in the axilla bilaterally  Heart: Regular rate and rhythm, S1 and S2 normal, II/VI soft systolic m, no rub, or gallop,   Abdomen: Soft, non-tender, bowel sounds active, no masses, no organomegaly   Extremities: Extremities normal, atraumatic, no cyanosis or edema   Pelvic:Not examined  Skin: Skin color, texture, turgor normal, no rashes or lesions   Neurologic: Normal       Assessment Results 12/6/2018   Activities of Daily Living No help needed   Instrumental Activities of Daily Living No help needed   Mini Cog Total Score 5   Some recent data might be hidden     A Mini-Cog score of 0-2 suggests the possibility of dementia, score of 3-5 suggests no dementia    Identified Health Risks:     She is at risk for lack of exercise and has been provided with information to increase physical activity for the benefit of her well-being.  Information on urinary incontinence and treatment options given to patient.  The patient was provided with suggestions to help her develop a healthy emotional lifestyle.   The patient's PHQ-9 score is consistent with mild depression. She was provided with information regarding depression and was advised to schedule a " follow up appointment in a few weeks to further address this issue.  Patient's advanced directive was discussed and I am comfortable with the patient's wishes.

## 2021-06-23 NOTE — TELEPHONE ENCOUNTER
Medication Request  Medication name: Accu Chek Mary meter kit  Pharmacy Name and Location: [facility]  Reason for request: Patient needs new meter.  When did you use medication last?:  n/a  Okay to leave a detailed message: yes

## 2021-06-23 NOTE — TELEPHONE ENCOUNTER
All Medicare patient's need ICD-10 codes on diabetic supply prescriptions sent to pharmacies for them to be processed.      Please send a new prescription for patient's glucometer including an appropriate ICD-10 code to [id number]. Thank you.

## 2021-06-24 NOTE — PROGRESS NOTES
ASSESSMENT and PLAN:  1. Mixed hyperlipidemia  She will get a CT calcium score to help with risk stratification.  She will schedule follow-up to review results once she receives them herself.  - CT Cardiac Calcium Score; Future    2. Prediabetes  Improved w/ diet changes  - CT Cardiac Calcium Score; Future    TT: 30 min, >50% in counsleling and coordination of care related to her cholesterol    Medications Discontinued During This Encounter   Medication Reason     cholecalciferol, vitamin D3, (VITAMIN D3) 2,000 unit Tab Duplicate order       No Follow-up on file.    Patient Instructions   I recommend the coronary artery calcium test.  It can be done at [facility].  If not covered,  Please set up the CT Calcium Score:  [phone] ([facility]).  Please have them send the results to me (clinic fax:  [phone]).  We'll be in touch after I have the results.  ($99 out of pocket flat fee)    Take care!        CHIEF COMPLAINT:  Chief Complaint   Patient presents with     Hyperlipidemia     Diabetes       HISTORY OF PRESENT ILLNESS:  Davis Mesa is a 74 y.o. female  presenting to the clinic today for f/u of her prediabetes.  She's been taking cinnamon daily.  Her A1c was down 5.9.    She lost two sisters w/in 5 months.  She thinks she may have let her own health go after that.    If her cholesterol improved significantly with some changes in her diet.  Using the combined cohort pooled equation, her 10-year risk for heart disease is 15%.  Using [place], it is 5%.  Because of this discrepancy and her reluctance to take statins, I think she would benefit from additional risk stratification with a coronary artery calcium score.  I explained this to her.  She understands and is in agreement with this.  She was also made aware today that I am leaving clinic after today.  She was advised to contact the clinic when she gets her results so that she can schedule a follow-up with someone to go over them.    REVIEW OF  SYSTEMS:    All other systems are negative.    PFSH:  Family History   Problem Relation Age of Onset     Hypertension Mother      Heart disease Sister      Hypertension Sister      Diabetes Sister      Asthma Brother      Prostate cancer Brother      Heart disease Brother      Hypertension Brother      Alzheimer's disease Sister            TOBACCO USE:  Social History     Tobacco Use   Smoking Status Never Smoker   Smokeless Tobacco Never Used       VITALS:  Vitals:    03/13/19 0857   BP: 122/60   Pulse: 72   Weight: 112 lb 11.2 oz (51.1 kg)     Wt Readings from Last 3 Encounters:   03/13/19 112 lb 11.2 oz (51.1 kg)   12/06/18 112 lb (50.8 kg)   10/18/18 113 lb (51.3 kg)         PHYSICAL EXAM:  Constitutional:  Reveals an alert, pleasant adult female.   Vitals:  Noted.       MEDICATIONS:  Current Outpatient Medications   Medication Sig Dispense Refill     blood glucose test strips Use 1 each As Directed 2 (two) times a day. Use as directed. Test 2 times daily. Dx: R73.9 Non insulin dependant. 200 strip 3     blood-glucose meter Misc Use 1 Device As Directed daily. 1 each 0     calcium citrate/vitamin D3 (CITRACAL + D ORAL) Take 200 mg by mouth.       calcium-vitamin D (CALCIUM-VITAMIN D) 500 mg(1,250mg) -200 unit per tablet Take 1 tablet by mouth 2 (two) times a day with meals.       cholecalciferol, vitamin D3, (VITAMIN D3) 5,000 unit Tab Take 5,000 Units by mouth.       generic lancets (ACCU-CHEK FASTCLIX) Test blood sugar twice daily. Dispense brand per patient's insurance at pharmacy discretion. 100 each 11     magnesium 200 mg Tab Take by mouth.       VIT C/VIT E ACETATE/LUTEIN/MIN (OCUVITE LUTEIN ORAL) Take 1 capsule by mouth daily.       pravastatin (PRAVACHOL) 20 MG tablet Take 1 tablet (20 mg total) by mouth at bedtime. 90 tablet 3     Current Facility-Administered Medications   Medication Dose Route Frequency Provider Last Rate Last Dose     denosumab 60 mg (PROLIA 60 mg/ml)  60 mg Subcutaneous Q6 Months  Olinda Wu MD   60 mg at 07/27/17 0937     denosumab 60 mg (PROLIA 60 mg/ml)  60 mg Subcutaneous Q6 Months Lottie Vergara MD   60 mg at 12/14/18 0829

## 2021-06-24 NOTE — PATIENT INSTRUCTIONS - HE
I recommend the coronary artery calcium test.  It can be done at [facility].  If not covered,  Please set up the CT Calcium Score:  [phone] ([facility]).  Please have them send the results to me (clinic fax:  [phone]).  We'll be in touch after I have the results.  ($99 out of pocket flat fee)    Take care!

## 2021-07-03 NOTE — ADDENDUM NOTE
Addendum Note by Bereket Tse CMA at 9/6/2018  3:40 PM     Author: Bereket Tse CMA Service: -- Author Type: Certified Medical Assistant    Filed: 9/6/2018  3:40 PM Encounter Date: 9/4/2018 Status: Signed    : Bereket Tse CMA (Certified Medical Assistant)    Addended by: BEREKET TSE on: 9/6/2018 03:40 PM        Modules accepted: Orders

## 2021-07-03 NOTE — ADDENDUM NOTE
Addendum Note by Bereket Tse CMA at 9/26/2018  4:20 PM     Author: Bereket Tse CMA Service: -- Author Type: Certified Medical Assistant    Filed: 9/26/2018  4:20 PM Encounter Date: 9/23/2018 Status: Signed    : Bereket Tse CMA (Certified Medical Assistant)    Addended by: BEREKET TSE on: 9/26/2018 04:20 PM        Modules accepted: Orders

## 2021-07-03 NOTE — ADDENDUM NOTE
Addendum Note by Bloch, Lisa M, CMA at 7/27/2017  9:38 AM     Author: Bloch, Lisa M, CMA Service: -- Author Type: Certified Medical Assistant    Filed: 7/27/2017  9:38 AM Encounter Date: 7/27/2017 Status: Signed    : Bloch, Lisa M, CMA (Certified Medical Assistant)    Addended by: BLOCH, LISA M on: 7/27/2017 09:38 AM        Modules accepted: Orders

## 2021-07-03 NOTE — ADDENDUM NOTE
Addendum Note by Bloch, Lisa M, CMA at 6/7/2018  1:02 PM     Author: Bloch, Lisa M, CMA Service: -- Author Type: Certified Medical Assistant    Filed: 6/7/2018  1:02 PM Encounter Date: 6/7/2018 Status: Signed    : Bloch, Lisa M, CMA (Certified Medical Assistant)    Addended by: BLOCH, LISA M on: 6/7/2018 01:02 PM        Modules accepted: Orders

## 2021-07-03 NOTE — ADDENDUM NOTE
Addendum Note by Elza Tong CMA at 2/5/2019  1:37 PM     Author: Elza Tong CMA Service: -- Author Type: Certified Medical Assistant    Filed: 2/5/2019  1:37 PM Encounter Date: 2/5/2019 Status: Signed    : Elza Tong CMA (Certified Medical Assistant)    Addended by: ELZA TONG on: 2/5/2019 01:37 PM        Modules accepted: Orders

## 2021-07-05 ENCOUNTER — TRANSCRIBE ORDERS (OUTPATIENT)
Dept: INTERNAL MEDICINE | Facility: CLINIC | Age: 76
End: 2021-07-05

## 2021-07-05 DIAGNOSIS — Z92.29 PERSONAL HISTORY OF OTHER DRUG THERAPY: ICD-10-CM

## 2021-07-05 DIAGNOSIS — M81.0 SENILE OSTEOPOROSIS: Primary | ICD-10-CM

## 2021-07-05 NOTE — PROGRESS NOTES
As part of the required manual data conversion process for integration, this encounter was created to document a CAM (Clinic Administered Medication) order. This information was copied from the [facility] patient's chart to the [facility] patient chart.     Marisela Tse, Erich  July 5, 2021

## 2021-10-28 ENCOUNTER — ANCILLARY PROCEDURE (OUTPATIENT)
Dept: BONE DENSITY | Facility: CLINIC | Age: 76
End: 2021-10-28
Attending: INTERNAL MEDICINE
Payer: COMMERCIAL

## 2021-10-28 DIAGNOSIS — M81.0 AGE-RELATED OSTEOPOROSIS WITHOUT CURRENT PATHOLOGICAL FRACTURE: ICD-10-CM

## 2021-10-28 PROCEDURE — 77080 DXA BONE DENSITY AXIAL: CPT | Mod: TC | Performed by: RADIOLOGY

## 2022-02-28 ENCOUNTER — TELEPHONE (OUTPATIENT)
Dept: SURGERY | Facility: CLINIC | Age: 77
End: 2022-02-28
Payer: COMMERCIAL

## 2022-02-28 NOTE — TELEPHONE ENCOUNTER
[facility] Call Center    Phone Message    May a detailed message be left on voicemail: yes     Reason for Call: Other: Pr pt would like to get schedule for her colonoscopy with . Per pt says it suppose to be every 5 years. Last  Time pt saw  was 2017.  Please call pt back to schedule thank you.     Action Taken: Message routed to:  [facility] COLON AND REC    Travel Screening: Not Applicable

## 2022-03-01 NOTE — TELEPHONE ENCOUNTER
Discussed with Davis that she does not need a colonoscopy at this time d/t her age. The [place] preventative task force doesn't recommend screening colonoscopies at the age of 76-85. She understands. I told her if she starts having symptoms such as rectal bleeding or pain then she may need a colonoscopy.

## 2022-06-04 ENCOUNTER — HEALTH MAINTENANCE LETTER (OUTPATIENT)
Age: 77
End: 2022-06-04

## 2022-11-27 ENCOUNTER — HEALTH MAINTENANCE LETTER (OUTPATIENT)
Age: 77
End: 2022-11-27

## 2023-06-11 ENCOUNTER — HEALTH MAINTENANCE LETTER (OUTPATIENT)
Age: 78
End: 2023-06-11

## 2023-10-30 ENCOUNTER — ANCILLARY PROCEDURE (OUTPATIENT)
Dept: BONE DENSITY | Facility: CLINIC | Age: 78
End: 2023-10-30
Attending: INTERNAL MEDICINE
Payer: COMMERCIAL

## 2023-10-30 DIAGNOSIS — M81.0 SENILE OSTEOPOROSIS: ICD-10-CM

## 2023-10-30 PROCEDURE — 77080 DXA BONE DENSITY AXIAL: CPT | Mod: TC | Performed by: RADIOLOGY

## 2024-08-04 ENCOUNTER — HEALTH MAINTENANCE LETTER (OUTPATIENT)
Age: 79
End: 2024-08-04

## 2024-12-21 ENCOUNTER — HEALTH MAINTENANCE LETTER (OUTPATIENT)
Age: 79
End: 2024-12-21

## 2025-08-16 ENCOUNTER — HEALTH MAINTENANCE LETTER (OUTPATIENT)
Age: 80
End: 2025-08-16